# Patient Record
Sex: FEMALE | Race: OTHER | URBAN - METROPOLITAN AREA
[De-identification: names, ages, dates, MRNs, and addresses within clinical notes are randomized per-mention and may not be internally consistent; named-entity substitution may affect disease eponyms.]

---
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
## 2024-01-09 ENCOUNTER — OUTPATIENT (OUTPATIENT)
Dept: OUTPATIENT SERVICES | Facility: HOSPITAL | Age: 19
LOS: 1 days | End: 2024-01-09
Payer: MEDICAID

## 2024-01-09 ENCOUNTER — APPOINTMENT (OUTPATIENT)
Dept: OBGYN | Facility: CLINIC | Age: 19
End: 2024-01-09
Payer: MEDICAID

## 2024-01-09 VITALS — WEIGHT: 194 LBS | SYSTOLIC BLOOD PRESSURE: 116 MMHG | DIASTOLIC BLOOD PRESSURE: 60 MMHG

## 2024-01-09 DIAGNOSIS — Z34.90 ENCOUNTER FOR SUPERVISION OF NORMAL PREGNANCY, UNSPECIFIED, UNSPECIFIED TRIMESTER: ICD-10-CM

## 2024-01-09 LAB
BILIRUB UR QL STRIP: NEGATIVE
CLARITY UR: CLEAR
COLLECTION METHOD: NORMAL
GLUCOSE UR-MCNC: NEGATIVE
HCG UR QL: NEGATIVE EU/DL
HGB UR QL STRIP.AUTO: NEGATIVE
KETONES UR-MCNC: NEGATIVE
LEUKOCYTE ESTERASE UR QL STRIP: NEGATIVE
NITRITE UR QL STRIP: NEGATIVE
PH UR STRIP: 6
PROT UR STRIP-MCNC: NEGATIVE
SP GR UR STRIP: 1.02

## 2024-01-09 PROCEDURE — 99212 OFFICE O/P EST SF 10 MIN: CPT

## 2024-01-09 PROCEDURE — 81002 URINALYSIS NONAUTO W/O SCOPE: CPT

## 2024-01-10 DIAGNOSIS — Z34.90 ENCOUNTER FOR SUPERVISION OF NORMAL PREGNANCY, UNSPECIFIED, UNSPECIFIED TRIMESTER: ICD-10-CM

## 2024-01-11 LAB
ALBUMIN SERPL ELPH-MCNC: 4 G/DL
ALP BLD-CCNC: 86 U/L
ALT SERPL-CCNC: 16 U/L
ANION GAP SERPL CALC-SCNC: 19 MMOL/L
AST SERPL-CCNC: 16 U/L
BACTERIA UR CULT: NORMAL
BILIRUB SERPL-MCNC: 0.4 MG/DL
BLD GP AB SCN SERPL QL: NORMAL
BUN SERPL-MCNC: 8 MG/DL
CALCIUM SERPL-MCNC: 9.1 MG/DL
CFTR MUT TESTED BLD/T: NEGATIVE
CHLORIDE SERPL-SCNC: 101 MMOL/L
CO2 SERPL-SCNC: 16 MMOL/L
CREAT SERPL-MCNC: 0.6 MG/DL
EGFR: 133 ML/MIN/1.73M2
ESTIMATED AVERAGE GLUCOSE: 103 MG/DL
GLUCOSE SERPL-MCNC: 88 MG/DL
HBA1C MFR BLD HPLC: 5.2 %
HBV SURFACE AG SER QL: NONREACTIVE
HCV AB SER QL: NONREACTIVE
HCV S/CO RATIO: 0.19 S/CO
HGB A MFR BLD: 97.7 %
HGB A2 MFR BLD: 2.3 %
HGB FRACT BLD-IMP: NORMAL
HIV1+2 AB SPEC QL IA.RAPID: NONREACTIVE
LEAD BLD-MCNC: <1 UG/DL
M TB IFN-G BLD-IMP: NEGATIVE
MEV IGG FLD QL IA: >300 AU/ML
MEV IGG+IGM SER-IMP: POSITIVE
MUV AB SER-ACNC: POSITIVE
MUV IGG SER QL IA: 12.4 AU/ML
POTASSIUM SERPL-SCNC: 4.1 MMOL/L
PROT SERPL-MCNC: 6.6 G/DL
QUANTIFERON TB PLUS MITOGEN MINUS NIL: 3.69 IU/ML
QUANTIFERON TB PLUS NIL: 0.03 IU/ML
QUANTIFERON TB PLUS TB1 MINUS NIL: -0.01 IU/ML
QUANTIFERON TB PLUS TB2 MINUS NIL: -0.02 IU/ML
RUBV IGG FLD-ACNC: 1.8 INDEX
RUBV IGG SER-IMP: POSITIVE
SODIUM SERPL-SCNC: 136 MMOL/L
T PALLIDUM AB SER QL IA: NEGATIVE
VZV AB TITR SER: POSITIVE
VZV IGG SER IF-ACNC: 361.1 INDEX

## 2024-01-16 RX ORDER — METOCLOPRAMIDE 5 MG/1
5 TABLET ORAL 3 TIMES DAILY
Qty: 20 | Refills: 0 | Status: ACTIVE | COMMUNITY
Start: 2023-09-05 | End: 1900-01-01

## 2024-01-22 ENCOUNTER — NON-APPOINTMENT (OUTPATIENT)
Age: 19
End: 2024-01-22

## 2024-01-25 ENCOUNTER — APPOINTMENT (OUTPATIENT)
Dept: ANTEPARTUM | Facility: CLINIC | Age: 19
End: 2024-01-25
Payer: MEDICAID

## 2024-01-25 ENCOUNTER — ASOB RESULT (OUTPATIENT)
Age: 19
End: 2024-01-25

## 2024-01-25 ENCOUNTER — APPOINTMENT (OUTPATIENT)
Dept: OBGYN | Facility: CLINIC | Age: 19
End: 2024-01-25
Payer: MEDICAID

## 2024-01-25 ENCOUNTER — OUTPATIENT (OUTPATIENT)
Dept: OUTPATIENT SERVICES | Facility: HOSPITAL | Age: 19
LOS: 1 days | End: 2024-01-25
Payer: MEDICAID

## 2024-01-25 VITALS — DIASTOLIC BLOOD PRESSURE: 74 MMHG | WEIGHT: 202 LBS | SYSTOLIC BLOOD PRESSURE: 112 MMHG

## 2024-01-25 DIAGNOSIS — Z03.74 ENCOUNTER FOR SUSPECTED PROBLEM WITH FETAL GROWTH RULED OUT: ICD-10-CM

## 2024-01-25 DIAGNOSIS — Z3A.32 32 WEEKS GESTATION OF PREGNANCY: ICD-10-CM

## 2024-01-25 DIAGNOSIS — Z34.90 ENCOUNTER FOR SUPERVISION OF NORMAL PREGNANCY, UNSPECIFIED, UNSPECIFIED TRIMESTER: ICD-10-CM

## 2024-01-25 DIAGNOSIS — O26.853 SPOTTING COMPLICATING PREGNANCY, THIRD TRIMESTER: ICD-10-CM

## 2024-01-25 PROCEDURE — 99212 OFFICE O/P EST SF 10 MIN: CPT

## 2024-01-25 PROCEDURE — 76816 OB US FOLLOW-UP PER FETUS: CPT

## 2024-01-25 PROCEDURE — 76816 OB US FOLLOW-UP PER FETUS: CPT | Mod: 26

## 2024-01-25 PROCEDURE — 76819 FETAL BIOPHYS PROFIL W/O NST: CPT | Mod: 26,59

## 2024-01-25 PROCEDURE — 81002 URINALYSIS NONAUTO W/O SCOPE: CPT

## 2024-01-25 PROCEDURE — 76819 FETAL BIOPHYS PROFIL W/O NST: CPT

## 2024-01-27 LAB
BILIRUB UR QL STRIP: NORMAL
CLARITY UR: CLEAR
GLUCOSE UR-MCNC: NORMAL
HCG UR QL: NORMAL EU/DL
HGB UR QL STRIP.AUTO: NORMAL
KETONES UR-MCNC: NORMAL
LEUKOCYTE ESTERASE UR QL STRIP: NORMAL
NITRITE UR QL STRIP: NORMAL
PH UR STRIP: 6
PROT UR STRIP-MCNC: NORMAL
SP GR UR STRIP: 1.01

## 2024-02-08 ENCOUNTER — NON-APPOINTMENT (OUTPATIENT)
Age: 19
End: 2024-02-08

## 2024-02-08 ENCOUNTER — OUTPATIENT (OUTPATIENT)
Dept: OUTPATIENT SERVICES | Facility: HOSPITAL | Age: 19
LOS: 1 days | End: 2024-02-08
Payer: MEDICAID

## 2024-02-08 ENCOUNTER — OUTPATIENT (OUTPATIENT)
Dept: INPATIENT UNIT | Facility: HOSPITAL | Age: 19
LOS: 1 days | Discharge: HOME | End: 2024-02-08
Payer: MEDICAID

## 2024-02-08 ENCOUNTER — APPOINTMENT (OUTPATIENT)
Dept: OBGYN | Facility: CLINIC | Age: 19
End: 2024-02-08
Payer: MEDICAID

## 2024-02-08 VITALS — SYSTOLIC BLOOD PRESSURE: 150 MMHG | WEIGHT: 197 LBS | DIASTOLIC BLOOD PRESSURE: 68 MMHG

## 2024-02-08 VITALS — SYSTOLIC BLOOD PRESSURE: 121 MMHG | HEART RATE: 91 BPM | DIASTOLIC BLOOD PRESSURE: 66 MMHG

## 2024-02-08 VITALS — TEMPERATURE: 98 F | DIASTOLIC BLOOD PRESSURE: 63 MMHG | HEART RATE: 87 BPM | SYSTOLIC BLOOD PRESSURE: 116 MMHG

## 2024-02-08 VITALS — DIASTOLIC BLOOD PRESSURE: 82 MMHG | SYSTOLIC BLOOD PRESSURE: 130 MMHG

## 2024-02-08 DIAGNOSIS — Z34.90 ENCOUNTER FOR SUPERVISION OF NORMAL PREGNANCY, UNSPECIFIED, UNSPECIFIED TRIMESTER: ICD-10-CM

## 2024-02-08 DIAGNOSIS — O26.899 OTHER SPECIFIED PREGNANCY RELATED CONDITIONS, UNSPECIFIED TRIMESTER: ICD-10-CM

## 2024-02-08 LAB
ALBUMIN SERPL ELPH-MCNC: 3.4 G/DL — LOW (ref 3.5–5.2)
ALP SERPL-CCNC: 218 U/L — HIGH (ref 30–115)
ALT FLD-CCNC: 64 U/L — HIGH (ref 14–37)
ANION GAP SERPL CALC-SCNC: 12 MMOL/L — SIGNIFICANT CHANGE UP (ref 7–14)
APTT BLD: 29.5 SEC — SIGNIFICANT CHANGE UP (ref 27–39.2)
AST SERPL-CCNC: 26 U/L — SIGNIFICANT CHANGE UP (ref 14–37)
BASOPHILS # BLD AUTO: 0.04 K/UL — SIGNIFICANT CHANGE UP (ref 0–0.2)
BASOPHILS NFR BLD AUTO: 0.3 % — SIGNIFICANT CHANGE UP (ref 0–1)
BILIRUB SERPL-MCNC: 0.4 MG/DL — SIGNIFICANT CHANGE UP (ref 0.2–1.2)
BUN SERPL-MCNC: 11 MG/DL — SIGNIFICANT CHANGE UP (ref 10–20)
CALCIUM SERPL-MCNC: 9.2 MG/DL — SIGNIFICANT CHANGE UP (ref 8.4–10.5)
CHLORIDE SERPL-SCNC: 105 MMOL/L — SIGNIFICANT CHANGE UP (ref 98–110)
CO2 SERPL-SCNC: 20 MMOL/L — SIGNIFICANT CHANGE UP (ref 17–32)
CREAT SERPL-MCNC: 0.6 MG/DL — SIGNIFICANT CHANGE UP (ref 0.3–1)
EGFR: 133 ML/MIN/1.73M2 — SIGNIFICANT CHANGE UP
EOSINOPHIL # BLD AUTO: 0.09 K/UL — SIGNIFICANT CHANGE UP (ref 0–0.7)
EOSINOPHIL NFR BLD AUTO: 0.7 % — SIGNIFICANT CHANGE UP (ref 0–8)
FIBRINOGEN PPP-MCNC: 579 MG/DL — HIGH (ref 200–435)
GLUCOSE SERPL-MCNC: 87 MG/DL — SIGNIFICANT CHANGE UP (ref 70–99)
HCT VFR BLD CALC: 33.7 % — LOW (ref 37–47)
HGB BLD-MCNC: 11.6 G/DL — LOW (ref 12–16)
IMM GRANULOCYTES NFR BLD AUTO: 1 % — HIGH (ref 0.1–0.3)
INR BLD: 0.99 RATIO — SIGNIFICANT CHANGE UP (ref 0.65–1.3)
LDH SERPL L TO P-CCNC: 172 — SIGNIFICANT CHANGE UP (ref 50–242)
LYMPHOCYTES # BLD AUTO: 16.1 % — LOW (ref 20.5–51.1)
LYMPHOCYTES # BLD AUTO: 2.12 K/UL — SIGNIFICANT CHANGE UP (ref 1.2–3.4)
MCHC RBC-ENTMCNC: 30.4 PG — SIGNIFICANT CHANGE UP (ref 27–31)
MCHC RBC-ENTMCNC: 34.4 G/DL — SIGNIFICANT CHANGE UP (ref 32–37)
MCV RBC AUTO: 88.5 FL — SIGNIFICANT CHANGE UP (ref 81–99)
MONOCYTES # BLD AUTO: 0.82 K/UL — HIGH (ref 0.1–0.6)
MONOCYTES NFR BLD AUTO: 6.2 % — SIGNIFICANT CHANGE UP (ref 1.7–9.3)
NEUTROPHILS # BLD AUTO: 9.97 K/UL — HIGH (ref 1.4–6.5)
NEUTROPHILS NFR BLD AUTO: 75.7 % — HIGH (ref 42.2–75.2)
NRBC # BLD: 0 /100 WBCS — SIGNIFICANT CHANGE UP (ref 0–0)
PLATELET # BLD AUTO: 284 K/UL — SIGNIFICANT CHANGE UP (ref 130–400)
PMV BLD: 11.6 FL — HIGH (ref 7.4–10.4)
POTASSIUM SERPL-MCNC: 4.2 MMOL/L — SIGNIFICANT CHANGE UP (ref 3.5–5)
POTASSIUM SERPL-SCNC: 4.2 MMOL/L — SIGNIFICANT CHANGE UP (ref 3.5–5)
PROT SERPL-MCNC: 6.4 G/DL — SIGNIFICANT CHANGE UP (ref 6.1–8)
PROTHROM AB SERPL-ACNC: 11.3 SEC — SIGNIFICANT CHANGE UP (ref 9.95–12.87)
RBC # BLD: 3.81 M/UL — LOW (ref 4.2–5.4)
RBC # FLD: 12.8 % — SIGNIFICANT CHANGE UP (ref 11.5–14.5)
SODIUM SERPL-SCNC: 137 MMOL/L — SIGNIFICANT CHANGE UP (ref 135–146)
URATE SERPL-MCNC: 3.5 MG/DL — SIGNIFICANT CHANGE UP (ref 2.5–7)
WBC # BLD: 13.17 K/UL — HIGH (ref 4.8–10.8)
WBC # FLD AUTO: 13.17 K/UL — HIGH (ref 4.8–10.8)

## 2024-02-08 PROCEDURE — 87389 HIV-1 AG W/HIV-1&-2 AB AG IA: CPT

## 2024-02-08 PROCEDURE — 87661 TRICHOMONAS VAGINALIS AMPLIF: CPT

## 2024-02-08 PROCEDURE — 85610 PROTHROMBIN TIME: CPT

## 2024-02-08 PROCEDURE — 82977 ASSAY OF GGT: CPT

## 2024-02-08 PROCEDURE — 85384 FIBRINOGEN ACTIVITY: CPT

## 2024-02-08 PROCEDURE — 82247 BILIRUBIN TOTAL: CPT

## 2024-02-08 PROCEDURE — 84156 ASSAY OF PROTEIN URINE: CPT

## 2024-02-08 PROCEDURE — 80053 COMPREHEN METABOLIC PANEL: CPT

## 2024-02-08 PROCEDURE — 82239 BILE ACIDS TOTAL: CPT

## 2024-02-08 PROCEDURE — 83615 LACTATE (LD) (LDH) ENZYME: CPT

## 2024-02-08 PROCEDURE — 86593 SYPHILIS TEST NON-TREP QUANT: CPT

## 2024-02-08 PROCEDURE — 82570 ASSAY OF URINE CREATININE: CPT

## 2024-02-08 PROCEDURE — 87340 HEPATITIS B SURFACE AG IA: CPT

## 2024-02-08 PROCEDURE — 99214 OFFICE O/P EST MOD 30 MIN: CPT

## 2024-02-08 PROCEDURE — 87591 N.GONORRHOEAE DNA AMP PROB: CPT

## 2024-02-08 PROCEDURE — 99213 OFFICE O/P EST LOW 20 MIN: CPT

## 2024-02-08 PROCEDURE — 81002 URINALYSIS NONAUTO W/O SCOPE: CPT

## 2024-02-08 PROCEDURE — 84550 ASSAY OF BLOOD/URIC ACID: CPT

## 2024-02-08 PROCEDURE — 85730 THROMBOPLASTIN TIME PARTIAL: CPT

## 2024-02-08 PROCEDURE — 99418 PROLNG IP/OBS E/M EA 15 MIN: CPT | Mod: 25

## 2024-02-08 PROCEDURE — 36415 COLL VENOUS BLD VENIPUNCTURE: CPT

## 2024-02-08 PROCEDURE — 87653 STREP B DNA AMP PROBE: CPT

## 2024-02-08 PROCEDURE — 85027 COMPLETE CBC AUTOMATED: CPT

## 2024-02-08 PROCEDURE — 99223 1ST HOSP IP/OBS HIGH 75: CPT | Mod: 25

## 2024-02-08 PROCEDURE — 87491 CHLMYD TRACH DNA AMP PROBE: CPT

## 2024-02-08 PROCEDURE — 85025 COMPLETE CBC W/AUTO DIFF WBC: CPT

## 2024-02-08 RX ORDER — CLOBETASOL PROPIONATE 0.5 MG/G
0.05 CREAM TOPICAL TWICE DAILY
Qty: 1 | Refills: 0 | Status: ACTIVE | COMMUNITY
Start: 2024-02-08 | End: 1900-01-01

## 2024-02-08 RX ORDER — CAMPHOR 0.45 %
25 GEL (GRAM) TOPICAL
Qty: 30 | Refills: 0 | Status: ACTIVE | COMMUNITY
Start: 2024-02-08 | End: 1900-01-01

## 2024-02-08 NOTE — OB PROVIDER TRIAGE NOTE - NSHPPHYSICALEXAM_GEN_ALL_CORE
T(C): 36.4 (02-08-24 @ 17:52), Max: 36.4 (02-08-24 @ 17:52)  HR: 87 (02-08-24 @ 17:52) (87 - 87)  BP: 116/63 (02-08-24 @ 17:52) (116/63 - 116/63)    Gen: A+OX3. NAD  Cardio: RRR  Resp: CTAB  Abd: Soft, Nontender. Gravid. No palpable contractions  SVE: C/L/P  Speculum: small scratch on vagina from speculum placement that is bleeding, cervix appears closed with no bleeding noted, no lesions, or abnormal discharge  BSS: BPP 8/8    FHR: 135BPM/mod evelia/accels pos   TOCO: no contractions

## 2024-02-08 NOTE — OB PROVIDER TRIAGE NOTE - NSOBPROVIDERNOTE_OBGYN_ALL_OB_FT
18y  at 34w6d, GBS unknown, with ithcing isolated elevated BP in office, s/p fall on her knees at 1540, reassuring fetal and maternal status    #r/o ICP  -f.u outpatient bile acids  -f/u CMP    #r/o PIH  -vitals q15min  -f/u PELs    #r/o abruption  -f/u abruption labs  -EFM/TOCO  -if not contractions monitoring may be terminated at 1940 18y  at 34w6d, GBS unknown, with ithcing isolated elevated BP in office, s/p fall on her knees at 1540, reassuring fetal and maternal status    #r/o ICP  -f.u outpatient bile acids  -f/u CMP    #r/o PIH  -vitals q15min  -f/u PELs    #r/o abruption  -f/u abruption labs  -EFM/TOCO  -if not contractions monitoring may be terminated at 1940    ADDENDUM: patient seen and assessed at bedside, reports she is feeling well. COntinues to deny obstetric complaints, endorses good fetal movement. Reviewed laboratory results, all wnl with exception of ALT, c/w outpatient labs today. Discussed patient likely has ICP. BPs wnl today, advised to monitor bps at home and keep a log. We discussed at length precautions and reasons to contact her physician and return the the hospital, she understood and all of her questions were answered.

## 2024-02-08 NOTE — OB PROVIDER TRIAGE NOTE - ATTENDING COMMENTS
I was physically present for the key portions of the evaluation and management (e/m) service provided. I agree with the above history,physical and plan which I have reviewed and edited where appropriate    Patient seen face to face and case reviewed, precautions given to patient    Patient presented to triage and was evaluated starting 17:47. The fetal heart rate monitor ended 20:47. I spent 180 minutes caring for the patient. It included obtaining a history, performing an examination, continuously monitoring the fetus and interpretation of the fetal heart rate strip, ordering and reviewing labs, documentingin the medical record and a discussion with the patient.

## 2024-02-08 NOTE — OB PROVIDER TRIAGE NOTE - NSHPLABSRESULTS_GEN_ALL_CORE
GCT 95    9/7/2023  Type and Screen: O pos  Antibody screen: neg   Rubella: immune  Varicella: immune   RPR: neg  HbSAg: neg  HIV: NR    9/7 O pos    Sonogram  32w6d EFW 2040gm 37%ile, BPP 8/8, 6.56cm, vtx, anterior placenta no previa  19w5d vtx, anterior no previa, MVP 4.35cm, 349gm, normal anatomy

## 2024-02-08 NOTE — OB PROVIDER TRIAGE NOTE - HISTORY OF PRESENT ILLNESS
16yo  at 34w6d dated by LMP c/w first trimester sono with CLAUDIA 3/15/24 presenting s/p fall on her knees that occurred approx 1 hour ago. Denies abdominal trauma, denies CTX, LOF, VB. Endorses good FM. Denies complications this pregnancy. Denies HA, dizziness, SOB, chest pain, palpitations, RUQ/epigastric pain, or LE swelling. Was seen in office today BP 150systolic with repeat wnl, was given BP cuff with instructions to monitor BPs at home. PELs performed today, AST/ALT     Last PO: this afternoon  Last BM: this AM

## 2024-02-09 DIAGNOSIS — R03.0 ELEVATED BLOOD-PRESSURE READING, WITHOUT DIAGNOSIS OF HYPERTENSION: ICD-10-CM

## 2024-02-09 DIAGNOSIS — O99.713 DISEASES OF THE SKIN AND SUBCUTANEOUS TISSUE COMPLICATING PREGNANCY, THIRD TRIMESTER: ICD-10-CM

## 2024-02-09 DIAGNOSIS — Z04.3 ENCOUNTER FOR EXAMINATION AND OBSERVATION FOLLOWING OTHER ACCIDENT: ICD-10-CM

## 2024-02-09 DIAGNOSIS — Z34.90 ENCOUNTER FOR SUPERVISION OF NORMAL PREGNANCY, UNSPECIFIED, UNSPECIFIED TRIMESTER: ICD-10-CM

## 2024-02-09 DIAGNOSIS — L29.9 PRURITUS, UNSPECIFIED: ICD-10-CM

## 2024-02-09 DIAGNOSIS — Z3A.35 35 WEEKS GESTATION OF PREGNANCY: ICD-10-CM

## 2024-02-09 DIAGNOSIS — O26.893 OTHER SPECIFIED PREGNANCY RELATED CONDITIONS, THIRD TRIMESTER: ICD-10-CM

## 2024-02-13 LAB
ALBUMIN SERPL ELPH-MCNC: 3.6 G/DL
ALP BLD-CCNC: 228 U/L
ALT SERPL-CCNC: 72 U/L
ANION GAP SERPL CALC-SCNC: 12 MMOL/L
AST SERPL-CCNC: 28 U/L
BASOPHILS # BLD AUTO: 0.04 K/UL
BASOPHILS NFR BLD AUTO: 0.3 %
BILE AC SER-MCNC: 3.6 UMOL/L
BILIRUB SERPL-MCNC: 0.4 MG/DL
BILIRUB SERPL-MCNC: 0.4 MG/DL
BUN SERPL-MCNC: 10 MG/DL
C TRACH RRNA SPEC QL NAA+PROBE: NOT DETECTED
CALCIUM SERPL-MCNC: 10 MG/DL
CHLORIDE SERPL-SCNC: 105 MMOL/L
CO2 SERPL-SCNC: 20 MMOL/L
CREAT SERPL-MCNC: 0.6 MG/DL
CREAT SPEC-SCNC: 77 MG/DL
CREAT/PROT UR: 0.2 RATIO
EGFR: 133 ML/MIN/1.73M2
EOSINOPHIL # BLD AUTO: 0.08 K/UL
EOSINOPHIL NFR BLD AUTO: 0.6 %
GGT SERPL-CCNC: 12 U/L
GLUCOSE SERPL-MCNC: 89 MG/DL
GP B STREP DNA SPEC QL NAA+PROBE: NOT DETECTED
HBV SURFACE AG SER QL: NONREACTIVE
HCT VFR BLD CALC: 37.1 %
HGB BLD-MCNC: 12.4 G/DL
HIV1+2 AB SPEC QL IA.RAPID: NONREACTIVE
IMM GRANULOCYTES NFR BLD AUTO: 0.9 %
LDH SERPL-CCNC: 188
LYMPHOCYTES # BLD AUTO: 2.13 K/UL
LYMPHOCYTES NFR BLD AUTO: 16.6 %
MAN DIFF?: NORMAL
MCHC RBC-ENTMCNC: 30.5 PG
MCHC RBC-ENTMCNC: 33.4 G/DL
MCV RBC AUTO: 91.4 FL
MONOCYTES # BLD AUTO: 0.77 K/UL
MONOCYTES NFR BLD AUTO: 6 %
N GONORRHOEA RRNA SPEC QL NAA+PROBE: NOT DETECTED
NEUTROPHILS # BLD AUTO: 9.72 K/UL
NEUTROPHILS NFR BLD AUTO: 75.6 %
PLATELET # BLD AUTO: 289 K/UL
PMV BLD AUTO: 0 /100 WBCS
POTASSIUM SERPL-SCNC: 4.2 MMOL/L
PROT SERPL-MCNC: 6.9 G/DL
PROT UR-MCNC: 16 MG/DLG/24H
RBC # BLD: 4.06 M/UL
RBC # FLD: 13 %
RPR SER-TITR: NORMAL
SODIUM SERPL-SCNC: 137 MMOL/L
SOURCE AMPLIFICATION: NORMAL
SOURCE AMPLIFICATION: NORMAL
SOURCE GBS: NORMAL
T VAGINALIS RRNA SPEC QL NAA+PROBE: NOT DETECTED
URATE SERPL-MCNC: 3.4 MG/DL
WBC # FLD AUTO: 12.85 K/UL

## 2024-02-14 ENCOUNTER — NON-APPOINTMENT (OUTPATIENT)
Age: 19
End: 2024-02-14

## 2024-02-15 ENCOUNTER — APPOINTMENT (OUTPATIENT)
Dept: OBGYN | Facility: CLINIC | Age: 19
End: 2024-02-15
Payer: MEDICAID

## 2024-02-15 ENCOUNTER — OUTPATIENT (OUTPATIENT)
Dept: OUTPATIENT SERVICES | Facility: HOSPITAL | Age: 19
LOS: 1 days | End: 2024-02-15
Payer: MEDICAID

## 2024-02-15 VITALS
SYSTOLIC BLOOD PRESSURE: 100 MMHG | WEIGHT: 202 LBS | HEIGHT: 61 IN | BODY MASS INDEX: 38.14 KG/M2 | DIASTOLIC BLOOD PRESSURE: 60 MMHG

## 2024-02-15 DIAGNOSIS — Z34.90 ENCOUNTER FOR SUPERVISION OF NORMAL PREGNANCY, UNSPECIFIED, UNSPECIFIED TRIMESTER: ICD-10-CM

## 2024-02-15 PROCEDURE — 99213 OFFICE O/P EST LOW 20 MIN: CPT

## 2024-02-15 PROCEDURE — 81002 URINALYSIS NONAUTO W/O SCOPE: CPT

## 2024-02-15 PROCEDURE — 80053 COMPREHEN METABOLIC PANEL: CPT

## 2024-02-15 PROCEDURE — 82239 BILE ACIDS TOTAL: CPT

## 2024-02-21 ENCOUNTER — NON-APPOINTMENT (OUTPATIENT)
Age: 19
End: 2024-02-21

## 2024-02-21 DIAGNOSIS — Z34.90 ENCOUNTER FOR SUPERVISION OF NORMAL PREGNANCY, UNSPECIFIED, UNSPECIFIED TRIMESTER: ICD-10-CM

## 2024-02-22 ENCOUNTER — APPOINTMENT (OUTPATIENT)
Dept: OBGYN | Facility: CLINIC | Age: 19
End: 2024-02-22
Payer: MEDICAID

## 2024-02-22 ENCOUNTER — OUTPATIENT (OUTPATIENT)
Dept: OUTPATIENT SERVICES | Facility: HOSPITAL | Age: 19
LOS: 1 days | End: 2024-02-22
Payer: MEDICAID

## 2024-02-22 VITALS — SYSTOLIC BLOOD PRESSURE: 120 MMHG | WEIGHT: 205 LBS | DIASTOLIC BLOOD PRESSURE: 70 MMHG

## 2024-02-22 DIAGNOSIS — Z34.90 ENCOUNTER FOR SUPERVISION OF NORMAL PREGNANCY, UNSPECIFIED, UNSPECIFIED TRIMESTER: ICD-10-CM

## 2024-02-22 LAB
ALBUMIN SERPL ELPH-MCNC: 3.4 G/DL
ALP BLD-CCNC: 205 U/L
ALT SERPL-CCNC: 34 U/L
ANION GAP SERPL CALC-SCNC: 13 MMOL/L
AST SERPL-CCNC: 20 U/L
BILE AC SER-MCNC: 6 UMOL/L
BILIRUB SERPL-MCNC: 0.4 MG/DL
BILIRUB UR QL STRIP: NORMAL
BUN SERPL-MCNC: 10 MG/DL
CALCIUM SERPL-MCNC: 8.8 MG/DL
CHLORIDE SERPL-SCNC: 105 MMOL/L
CLARITY UR: CLEAR
CO2 SERPL-SCNC: 20 MMOL/L
COLLECTION METHOD: NORMAL
CREAT SERPL-MCNC: 0.6 MG/DL
EGFR: 133 ML/MIN/1.73M2
GLUCOSE SERPL-MCNC: 79 MG/DL
GLUCOSE UR-MCNC: NORMAL
HCG UR QL: 0.2 EU/DL
HGB UR QL STRIP.AUTO: NORMAL
KETONES UR-MCNC: NORMAL
LEUKOCYTE ESTERASE UR QL STRIP: NORMAL
NITRITE UR QL STRIP: NORMAL
PH UR STRIP: 6
POTASSIUM SERPL-SCNC: 4.3 MMOL/L
PROT SERPL-MCNC: 6 G/DL
PROT UR STRIP-MCNC: NORMAL
SODIUM SERPL-SCNC: 138 MMOL/L
SP GR UR STRIP: 1.02

## 2024-02-22 PROCEDURE — 81002 URINALYSIS NONAUTO W/O SCOPE: CPT

## 2024-02-22 PROCEDURE — 99213 OFFICE O/P EST LOW 20 MIN: CPT

## 2024-02-23 ENCOUNTER — NON-APPOINTMENT (OUTPATIENT)
Age: 19
End: 2024-02-23

## 2024-02-23 ENCOUNTER — OUTPATIENT (OUTPATIENT)
Dept: OUTPATIENT SERVICES | Facility: HOSPITAL | Age: 19
LOS: 1 days | End: 2024-02-23

## 2024-02-23 ENCOUNTER — APPOINTMENT (OUTPATIENT)
Dept: OBGYN | Facility: CLINIC | Age: 19
End: 2024-02-23

## 2024-02-23 DIAGNOSIS — Z34.90 ENCOUNTER FOR SUPERVISION OF NORMAL PREGNANCY, UNSPECIFIED, UNSPECIFIED TRIMESTER: ICD-10-CM

## 2024-02-27 DIAGNOSIS — Z34.90 ENCOUNTER FOR SUPERVISION OF NORMAL PREGNANCY, UNSPECIFIED, UNSPECIFIED TRIMESTER: ICD-10-CM

## 2024-02-29 ENCOUNTER — OUTPATIENT (OUTPATIENT)
Dept: OUTPATIENT SERVICES | Facility: HOSPITAL | Age: 19
LOS: 1 days | End: 2024-02-29
Payer: MEDICAID

## 2024-02-29 ENCOUNTER — RESULT CHARGE (OUTPATIENT)
Age: 19
End: 2024-02-29

## 2024-02-29 ENCOUNTER — NON-APPOINTMENT (OUTPATIENT)
Age: 19
End: 2024-02-29

## 2024-02-29 ENCOUNTER — APPOINTMENT (OUTPATIENT)
Dept: OBGYN | Facility: CLINIC | Age: 19
End: 2024-02-29
Payer: MEDICAID

## 2024-02-29 VITALS
HEIGHT: 61 IN | DIASTOLIC BLOOD PRESSURE: 80 MMHG | BODY MASS INDEX: 38.51 KG/M2 | WEIGHT: 204 LBS | SYSTOLIC BLOOD PRESSURE: 126 MMHG

## 2024-02-29 VITALS
BODY MASS INDEX: 38.51 KG/M2 | SYSTOLIC BLOOD PRESSURE: 126 MMHG | DIASTOLIC BLOOD PRESSURE: 80 MMHG | WEIGHT: 204 LBS | HEIGHT: 61 IN

## 2024-02-29 DIAGNOSIS — Z34.90 ENCOUNTER FOR SUPERVISION OF NORMAL PREGNANCY, UNSPECIFIED, UNSPECIFIED TRIMESTER: ICD-10-CM

## 2024-02-29 PROCEDURE — 99213 OFFICE O/P EST LOW 20 MIN: CPT

## 2024-02-29 PROCEDURE — 82239 BILE ACIDS TOTAL: CPT

## 2024-02-29 PROCEDURE — 81002 URINALYSIS NONAUTO W/O SCOPE: CPT

## 2024-02-29 PROCEDURE — 80053 COMPREHEN METABOLIC PANEL: CPT

## 2024-03-01 ENCOUNTER — NON-APPOINTMENT (OUTPATIENT)
Age: 19
End: 2024-03-01

## 2024-03-05 ENCOUNTER — INPATIENT (INPATIENT)
Facility: HOSPITAL | Age: 19
LOS: 1 days | Discharge: HOME | DRG: 560 | End: 2024-03-07
Attending: STUDENT IN AN ORGANIZED HEALTH CARE EDUCATION/TRAINING PROGRAM | Admitting: STUDENT IN AN ORGANIZED HEALTH CARE EDUCATION/TRAINING PROGRAM
Payer: MEDICAID

## 2024-03-05 ENCOUNTER — NON-APPOINTMENT (OUTPATIENT)
Age: 19
End: 2024-03-05

## 2024-03-05 ENCOUNTER — OUTPATIENT (OUTPATIENT)
Dept: INPATIENT UNIT | Facility: HOSPITAL | Age: 19
LOS: 1 days | Discharge: HOME | End: 2024-03-05
Payer: MEDICAID

## 2024-03-05 VITALS
DIASTOLIC BLOOD PRESSURE: 70 MMHG | HEART RATE: 83 BPM | RESPIRATION RATE: 18 BRPM | SYSTOLIC BLOOD PRESSURE: 123 MMHG | TEMPERATURE: 98 F

## 2024-03-05 VITALS
SYSTOLIC BLOOD PRESSURE: 128 MMHG | DIASTOLIC BLOOD PRESSURE: 74 MMHG | RESPIRATION RATE: 16 BRPM | HEART RATE: 99 BPM | TEMPERATURE: 98 F

## 2024-03-05 DIAGNOSIS — O26.899 OTHER SPECIFIED PREGNANCY RELATED CONDITIONS, UNSPECIFIED TRIMESTER: ICD-10-CM

## 2024-03-05 DIAGNOSIS — O42.92 FULL-TERM PREMATURE RUPTURE OF MEMBRANES, UNSPECIFIED AS TO LENGTH OF TIME BETWEEN RUPTURE AND ONSET OF LABOR: ICD-10-CM

## 2024-03-05 LAB
ALBUMIN SERPL ELPH-MCNC: 3.7 G/DL
ALP BLD-CCNC: 256 U/L
ALT SERPL-CCNC: 54 U/L
ANION GAP SERPL CALC-SCNC: 8 MMOL/L
APPEARANCE UR: CLEAR — SIGNIFICANT CHANGE UP
AST SERPL-CCNC: 31 U/L
BACTERIA # UR AUTO: NEGATIVE /HPF — SIGNIFICANT CHANGE UP
BASOPHILS # BLD AUTO: 0.04 K/UL — SIGNIFICANT CHANGE UP (ref 0–0.2)
BASOPHILS NFR BLD AUTO: 0.2 % — SIGNIFICANT CHANGE UP (ref 0–1)
BILIRUB SERPL-MCNC: 0.4 MG/DL
BILIRUB UR QL STRIP: NEGATIVE
BILIRUB UR-MCNC: NEGATIVE — SIGNIFICANT CHANGE UP
BLD GP AB SCN SERPL QL: SIGNIFICANT CHANGE UP
BUN SERPL-MCNC: 10 MG/DL
CALCIUM SERPL-MCNC: 9.1 MG/DL
CAST: 9 /LPF — HIGH (ref 0–4)
CHLORIDE SERPL-SCNC: 106 MMOL/L
CLARITY UR: CLEAR
CO2 SERPL-SCNC: 25 MMOL/L
COLLECTION METHOD: NORMAL
COLOR SPEC: YELLOW — SIGNIFICANT CHANGE UP
CREAT SERPL-MCNC: 0.7 MG/DL
DIFF PNL FLD: ABNORMAL
EGFR: 128 ML/MIN/1.73M2
EOSINOPHIL # BLD AUTO: 0 K/UL — SIGNIFICANT CHANGE UP (ref 0–0.7)
EOSINOPHIL NFR BLD AUTO: 0 % — SIGNIFICANT CHANGE UP (ref 0–8)
GLUCOSE SERPL-MCNC: 82 MG/DL
GLUCOSE UR QL: NEGATIVE MG/DL — SIGNIFICANT CHANGE UP
GLUCOSE UR-MCNC: NEGATIVE
HCG UR QL: 0.2 EU/DL
HCT VFR BLD CALC: 36 % — LOW (ref 37–47)
HGB BLD-MCNC: 12.2 G/DL — SIGNIFICANT CHANGE UP (ref 12–16)
HGB UR QL STRIP.AUTO: NEGATIVE
IMM GRANULOCYTES NFR BLD AUTO: 0.8 % — HIGH (ref 0.1–0.3)
KETONES UR-MCNC: 40 MG/DL
KETONES UR-MCNC: NEGATIVE
LEUKOCYTE ESTERASE UR QL STRIP: NORMAL
LEUKOCYTE ESTERASE UR-ACNC: ABNORMAL
LYMPHOCYTES # BLD AUTO: 1.48 K/UL — SIGNIFICANT CHANGE UP (ref 1.2–3.4)
LYMPHOCYTES # BLD AUTO: 8.8 % — LOW (ref 20.5–51.1)
MCHC RBC-ENTMCNC: 30.5 PG — SIGNIFICANT CHANGE UP (ref 27–31)
MCHC RBC-ENTMCNC: 33.9 G/DL — SIGNIFICANT CHANGE UP (ref 32–37)
MCV RBC AUTO: 90 FL — SIGNIFICANT CHANGE UP (ref 81–99)
MONOCYTES # BLD AUTO: 0.67 K/UL — HIGH (ref 0.1–0.6)
MONOCYTES NFR BLD AUTO: 4 % — SIGNIFICANT CHANGE UP (ref 1.7–9.3)
NEUTROPHILS # BLD AUTO: 14.42 K/UL — HIGH (ref 1.4–6.5)
NEUTROPHILS NFR BLD AUTO: 86.2 % — HIGH (ref 42.2–75.2)
NITRITE UR QL STRIP: NEGATIVE
NITRITE UR-MCNC: NEGATIVE — SIGNIFICANT CHANGE UP
NRBC # BLD: 0 /100 WBCS — SIGNIFICANT CHANGE UP (ref 0–0)
PH UR STRIP: 6.5
PH UR: 6.5 — SIGNIFICANT CHANGE UP (ref 5–8)
PLATELET # BLD AUTO: 236 K/UL — SIGNIFICANT CHANGE UP (ref 130–400)
PMV BLD: 12.5 FL — HIGH (ref 7.4–10.4)
POTASSIUM SERPL-SCNC: 4.4 MMOL/L
PRENATAL SYPHILIS TEST: SIGNIFICANT CHANGE UP
PROT SERPL-MCNC: 6.8 G/DL
PROT UR STRIP-MCNC: NEGATIVE
PROT UR-MCNC: SIGNIFICANT CHANGE UP MG/DL
RBC # BLD: 4 M/UL — LOW (ref 4.2–5.4)
RBC # FLD: 13.9 % — SIGNIFICANT CHANGE UP (ref 11.5–14.5)
RBC CASTS # UR COMP ASSIST: 40 /HPF — HIGH (ref 0–4)
SODIUM SERPL-SCNC: 139 MMOL/L
SP GR SPEC: 1.02 — SIGNIFICANT CHANGE UP (ref 1–1.03)
SP GR UR STRIP: 1.01
SQUAMOUS # UR AUTO: 4 /HPF — SIGNIFICANT CHANGE UP (ref 0–5)
UROBILINOGEN FLD QL: 1 MG/DL — SIGNIFICANT CHANGE UP (ref 0.2–1)
WBC # BLD: 16.74 K/UL — HIGH (ref 4.8–10.8)
WBC # FLD AUTO: 16.74 K/UL — HIGH (ref 4.8–10.8)
WBC UR QL: 8 /HPF — HIGH (ref 0–5)

## 2024-03-05 PROCEDURE — 86850 RBC ANTIBODY SCREEN: CPT

## 2024-03-05 PROCEDURE — 59025 FETAL NON-STRESS TEST: CPT

## 2024-03-05 PROCEDURE — 76815 OB US LIMITED FETUS(S): CPT | Mod: 26

## 2024-03-05 PROCEDURE — 85025 COMPLETE CBC W/AUTO DIFF WBC: CPT

## 2024-03-05 PROCEDURE — 80307 DRUG TEST PRSMV CHEM ANLYZR: CPT

## 2024-03-05 PROCEDURE — 80354 DRUG SCREENING FENTANYL: CPT

## 2024-03-05 PROCEDURE — 36415 COLL VENOUS BLD VENIPUNCTURE: CPT

## 2024-03-05 PROCEDURE — 81001 URINALYSIS AUTO W/SCOPE: CPT

## 2024-03-05 PROCEDURE — 76818 FETAL BIOPHYS PROFILE W/NST: CPT | Mod: 26

## 2024-03-05 PROCEDURE — 86900 BLOOD TYPING SEROLOGIC ABO: CPT

## 2024-03-05 PROCEDURE — 86592 SYPHILIS TEST NON-TREP QUAL: CPT

## 2024-03-05 PROCEDURE — 59050 FETAL MONITOR W/REPORT: CPT

## 2024-03-05 PROCEDURE — 86901 BLOOD TYPING SEROLOGIC RH(D): CPT

## 2024-03-05 PROCEDURE — 99214 OFFICE O/P EST MOD 30 MIN: CPT

## 2024-03-05 PROCEDURE — 99223 1ST HOSP IP/OBS HIGH 75: CPT

## 2024-03-05 RX ORDER — OXYTOCIN 10 UNIT/ML
333.33 VIAL (ML) INJECTION
Qty: 20 | Refills: 0 | Status: DISCONTINUED | OUTPATIENT
Start: 2024-03-05 | End: 2024-03-06

## 2024-03-05 RX ORDER — CHLORHEXIDINE GLUCONATE 213 G/1000ML
1 SOLUTION TOPICAL DAILY
Refills: 0 | Status: DISCONTINUED | OUTPATIENT
Start: 2024-03-05 | End: 2024-03-05

## 2024-03-05 RX ORDER — OXYTOCIN 10 UNIT/ML
2 VIAL (ML) INJECTION
Qty: 30 | Refills: 0 | Status: DISCONTINUED | OUTPATIENT
Start: 2024-03-05 | End: 2024-03-06

## 2024-03-05 RX ORDER — SODIUM CHLORIDE 9 MG/ML
1000 INJECTION, SOLUTION INTRAVENOUS
Refills: 0 | Status: DISCONTINUED | OUTPATIENT
Start: 2024-03-05 | End: 2024-03-05

## 2024-03-05 RX ORDER — SODIUM CHLORIDE 9 MG/ML
1000 INJECTION, SOLUTION INTRAVENOUS ONCE
Refills: 0 | Status: DISCONTINUED | OUTPATIENT
Start: 2024-03-05 | End: 2024-03-05

## 2024-03-05 RX ORDER — OXYTOCIN 10 UNIT/ML
333.33 VIAL (ML) INJECTION
Qty: 20 | Refills: 0 | Status: DISCONTINUED | OUTPATIENT
Start: 2024-03-05 | End: 2024-03-05

## 2024-03-05 RX ORDER — INFLUENZA VIRUS VACCINE 15; 15; 15; 15 UG/.5ML; UG/.5ML; UG/.5ML; UG/.5ML
0.5 SUSPENSION INTRAMUSCULAR ONCE
Refills: 0 | Status: DISCONTINUED | OUTPATIENT
Start: 2024-03-05 | End: 2024-03-06

## 2024-03-05 RX ORDER — SODIUM CHLORIDE 9 MG/ML
1000 INJECTION, SOLUTION INTRAVENOUS
Refills: 0 | Status: DISCONTINUED | OUTPATIENT
Start: 2024-03-05 | End: 2024-03-06

## 2024-03-05 RX ORDER — ONDANSETRON 8 MG/1
4 TABLET, FILM COATED ORAL ONCE
Refills: 0 | Status: COMPLETED | OUTPATIENT
Start: 2024-03-05 | End: 2024-03-05

## 2024-03-05 RX ORDER — CEFAZOLIN SODIUM 1 G
2000 VIAL (EA) INJECTION ONCE
Refills: 0 | Status: DISCONTINUED | OUTPATIENT
Start: 2024-03-05 | End: 2024-03-05

## 2024-03-05 RX ADMIN — ONDANSETRON 4 MILLIGRAM(S): 8 TABLET, FILM COATED ORAL at 22:45

## 2024-03-05 NOTE — OB PROVIDER TRIAGE NOTE - HISTORY OF PRESENT ILLNESS
17 yo  edc 3/15/24 c/o bloody mucous which came out of vagina while urinating today and also some irreg and painful ctxs. +fm, denies ROM and pregnancy complications. Pt recently had CMP and bile acids sent as she was experiencing itchy palms. She did not yet receive results. Pt denies med/surg hx. She has NKDA and sts her GBS is negative. Pt only takes prenatal vitamins. Last visit at [facility] with Dr nicholson was on 24 and she has a f/u appt on 3/7/24

## 2024-03-05 NOTE — OB RN TRIAGE NOTE - FALL HARM RISK - RISK INTERVENTIONS

## 2024-03-05 NOTE — OB PROVIDER H&P - ASSESSMENT
18y  @ 38.4 weeks, GBS neg, in labor    Admit to L & D  IV hydration, labs  Continuous EFM & TOCO monitoring  Clear Liquid diet  Pain Management PRN      Discussed with Dr. Zavala 18y  at 38.4 weeks, GBS neg, in labor    Admit to L & D  IV hydration, labs  Continuous EFM & TOCO monitoring  Clear Liquid diet  Pain Management PRN      Discussed with Dr. Zavala

## 2024-03-05 NOTE — OB PROVIDER H&P - ATTENDING COMMENTS
18y  at 38.4 weeks, GBS neg, in labor  vtx presenting on sonogram. membranes intact  Maternal and fetal status reassuring   EFW ~3000 by leopold's      Labor process discussed with patient and she was informed process may take up to 3 days and involves augmentation methods such as pitocin. Other methods include AROM. Possibility and management of nonreassuring fetal status discussed (including resuscitative measures with repositioning, IV hydration, oxygen administration). Possibility of  delivery in event of persistent nonreassuring status remote from delivery or failure to progress/ descend.  Risks of hemorrhage and infection requiring medication or blood transfusion discussed. Patient endorsed understanding. All questions were answered. Patient desires to proceed with induction.     -anesthesia for pain management  -plan to AROM on next exam and start pitocin  -re-examine in 4 hrs or sooner if indicated

## 2024-03-05 NOTE — OB RN PATIENT PROFILE - FALL HARM RISK - RISK INTERVENTIONS

## 2024-03-05 NOTE — OB PROVIDER H&P - NSHPPHYSICALEXAM_GEN_ALL_CORE
Vital Signs Last 24 Hrs  T(C): 36.4 (05 Mar 2024 19:35), Max: 36.9 (05 Mar 2024 15:09)  T(F): 97.5 (05 Mar 2024 19:35), Max: 98.4 (05 Mar 2024 15:09)  HR: 83 (05 Mar 2024 19:35) (83 - 99)  BP: 123/70 (05 Mar 2024 19:35) (123/70 - 128/74)  RR: 18 (05 Mar 2024 19:35) (16 - 18)    Gen: AOx3, no acute distress  Abd: soft, gravid, non tender, mildly palpable contractions  Ext: No edema bilaterally    EFM:  130/mod/+accels; cat 1  [place]: q2  SVE: 4/70/-2   vertex intact

## 2024-03-05 NOTE — OB PROVIDER TRIAGE NOTE - NSHPPHYSICALEXAM_GEN_ALL_CORE
Vital Signs Last 24 Hrs  T(C): 36.9 (05 Mar 2024 15:09), Max: 36.9 (05 Mar 2024 15:09)  T(F): 98.4 (05 Mar 2024 15:09), Max: 98.4 (05 Mar 2024 15:09)  HR: 99 (05 Mar 2024 15:09) (99 - 99)  BP: 128/74 (05 Mar 2024 15:09) (128/74 - 128/74)  BP(mean): --  RR: 16 (05 Mar 2024 15:09) (16 - 16)  SpO2: --    SSE: membranes visualized, cervical dilation noted. No pooling or active bleeding noted.   fhr 140s, reactive, toco mild-moderate and irreg  ve 2-3/70/-2  BSS: c/a/buzz 10.3/+fm and FHR/ EFW 7-3oz 3260gm

## 2024-03-05 NOTE — OB RN TRIAGE NOTE - NS_TRIAGEPROVIDERNOTIFIED_OBGYN_ALL_OB_FT
Your test was NEGATIVE for COVID-19 (coronavirus).      You may leave home and/or return to work when the following conditions are met:  24 hours fever free without fever-reducing medications AND  Improved symptoms  You are fully vaccinated or have not had close contact with someone with COVID-19 (within 6 feet for 15 minutes or more)    If you are fully vaccinated and had a close contact, there is no need for quarantine, unless you develop symptoms.      If you are not fully vaccinated and had a close contact:  Retest at 5 to 7 days post-exposure  If possible, it is recommended that you quarantine for 5 days from the time of contact regardless of your test status.  A mask should be worn indoors post quarantine.         gumaro real

## 2024-03-05 NOTE — OB RN TRIAGE NOTE - FALL HARM RISK - UNIVERSAL INTERVENTIONS
Bed in lowest position, wheels locked, appropriate side rails in place/Call bell, personal items and telephone in reach/Instruct patient to call for assistance before getting out of bed or chair/Non-slip footwear when patient is out of bed/[employer] [place] to call system/Physically safe environment - no spills, clutter or unnecessary equipment/Purposeful Proactive Rounding/Room/bathroom lighting operational, light cord in reach

## 2024-03-05 NOTE — OB PROVIDER H&P - HISTORY OF PRESENT ILLNESS
19y/o  at 38w4d, dated by LMP confirmed by first trimester sonogram with CLAUDIA 3/15/2024, presents for contractions since this morning, increasing in frequency and duration. Denies ROM and pregnancy complications. Patient had negative work up for r/o cholestasis. Denies vaginal bleeding. Feels good fetal movements. Previously seen on L&D this morning and was 2.5cm. No complications this pregnancy. GBS neg         17y/o  at 38w4d, dated by LMP confirmed by first trimester sonogram with CLAUDIA 3/15/2024, presents for contractions since this morning, increasing in frequency and duration. Denies ROM and pregnancy complications. Patient had negative work up for r/o cholestasis. Denies vaginal bleeding. Feels good fetal movements. Previously seen on L&D this morning and was 2.5cm. No complications this pregnancy.

## 2024-03-05 NOTE — OB PROVIDER H&P - NSHPPHYSICALEXAM_GEN_ALL_CORE
Vital Signs Last 24 Hrs  T(C): 36.9 (05 Mar 2024 15:09), Max: 36.9 (05 Mar 2024 15:09)  T(F): 98.4 (05 Mar 2024 15:09), Max: 98.4 (05 Mar 2024 15:09)  HR: 99 (05 Mar 2024 15:09) (99 - 99)  BP: 128/74 (05 Mar 2024 15:09) (128/74 - 128/74)  BP(mean): --  RR: 16 (05 Mar 2024 15:09) (16 - 16)  SpO2: --

## 2024-03-05 NOTE — OB RN PATIENT PROFILE - NS_FINALEDD_OBGYN_ALL_OB_DT
"Chief Complaint   Patient presents with     ER F/U     /73 (BP Location: Left arm, Patient Position: Sitting, Cuff Size: Adult Regular)   Pulse 56   Temp 98.5  F (36.9  C) (Oral)   Resp 16   Ht 1.613 m (5' 3.5\")   Wt 59 kg (130 lb)   SpO2 98%   Breastfeeding? No   BMI 22.67 kg/m   Estimated body mass index is 22.67 kg/m  as calculated from the following:    Height as of this encounter: 1.613 m (5' 3.5\").    Weight as of this encounter: 59 kg (130 lb).  bp completed using cuff size: regular       Health Maintenance addressed:  NONE    n/a    Duke Figueroa MA     "
15-Mar-2024

## 2024-03-05 NOTE — OB RN TRIAGE NOTE - NSSDOHCURSE_OBGYN_A_OB
"-- Message is from the [facility]--    Referral Request  Name of Specialist: DR Evelin Stevenson    Provider's specialty: Ophthalmology  Reason for referral: FOLLOW UP     Is this a NEW request?:       Referral ordered by: DR CLAY      Insurance type:       Payor: [address] [place] [address] Road / Plan: [address] / Product Type: HILLARY Risk      Preferred Delivery Method   Fax - number to send to: [phone]  PATIENTS APPT IS 2019    Caller Information       Type Contact Phone    2019 01:34 PM Phone (Incoming) Kristine (Network) [phone]          Alternative phone number:     Turnaround time given to caller: ""This message will be sent to [facility] Provider's full name]. The clinical team will return your call as soon as they review your message. Typically, it takes 3 business days to process referral requests. \""  "
REFERAL DONE AND FAXED TO DR Muro Pollen OFFICE, PATIENT AWARE
never

## 2024-03-05 NOTE — OB PROVIDER TRIAGE NOTE - ATTENDING COMMENTS
P0 AT 38+4WGA IN PRODROMAL LABOR   MATERNAL AND FETAL STATUS REASSURING   AMBULATION AND HYDRATION ENCOURAGED   RETURN PRECAUTIONS GIVEN

## 2024-03-05 NOTE — OB RN PATIENT PROFILE - HEART RATE (BEATS/MIN)
Pt called he was discharged today from hospital dr Cynthia Eller he was prescribed lyrica and it needs a prior auth he is out of this and needs the medication   Please call him asap today 83

## 2024-03-05 NOTE — OB RN TRIAGE NOTE - FALL HARM RISK - FALLEN IN PAST
Mohs Rapid Report Verbiage: The area of clinically evident tumor was marked with skin marking ink and appropriately hatched.  The initial incision was made following the Mohs approach through the skin.  The specimen was taken to the lab, divided into the necessary number of pieces, chromacoded and processed according to the Mohs protocol.  This was repeated in successive stages until a tumor free defect was achieved. No

## 2024-03-05 NOTE — OB RN PATIENT PROFILE - NS_PRENATALLOC_OBGYN_ALL_OB
Patient vital signs are at baseline: Yes  Patient able to ambulate as they were prior to admission or with assist devices provided by therapies during their stay:  Yes  Patient MUST void prior to discharge:  Yes  Patient able to tolerate oral intake:  Yes  Pain has adequate pain control using Oral analgesics:  Yes     MD Office

## 2024-03-05 NOTE — OB PROVIDER TRIAGE NOTE - ADDITIONAL INSTRUCTIONS
early labor  ambulate in hospital or at home  keep hydrated with increased fluid intake  return to L&D when contractions become more painful, when water breaks or when patient needs pain medication

## 2024-03-05 NOTE — OB PROVIDER TRIAGE NOTE - NSOBPROVIDERNOTE_OBGYN_ALL_OB_FT
a/p iup at 38.4 wks        early labor        pt does not want pain mgmt or intervention at this time        CMP and bile acid results are wnl        pt will ambulate at home and return to L&D when ctxs become more frequent/painful or if SROM        dr contreras aware and agrees with mgmt

## 2024-03-06 LAB
AMPHET UR-MCNC: NEGATIVE — SIGNIFICANT CHANGE UP
BARBITURATES UR SCN-MCNC: NEGATIVE — SIGNIFICANT CHANGE UP
BASOPHILS # BLD AUTO: 0.04 K/UL — SIGNIFICANT CHANGE UP (ref 0–0.2)
BASOPHILS NFR BLD AUTO: 0.3 % — SIGNIFICANT CHANGE UP (ref 0–1)
BENZODIAZ UR-MCNC: NEGATIVE — SIGNIFICANT CHANGE UP
BUPRENORPHINE SCREEN, URINE RESULT: NEGATIVE — SIGNIFICANT CHANGE UP
COCAINE METAB.OTHER UR-MCNC: NEGATIVE — SIGNIFICANT CHANGE UP
EOSINOPHIL # BLD AUTO: 0.05 K/UL — SIGNIFICANT CHANGE UP (ref 0–0.7)
EOSINOPHIL NFR BLD AUTO: 0.3 % — SIGNIFICANT CHANGE UP (ref 0–8)
FENTANYL UR QL: NEGATIVE — SIGNIFICANT CHANGE UP
HCT VFR BLD CALC: 31.8 % — LOW (ref 37–47)
HGB BLD-MCNC: 10.8 G/DL — LOW (ref 12–16)
IMM GRANULOCYTES NFR BLD AUTO: 0.7 % — HIGH (ref 0.1–0.3)
L&D DRUG SCREEN, URINE: SIGNIFICANT CHANGE UP
LYMPHOCYTES # BLD AUTO: 1.97 K/UL — SIGNIFICANT CHANGE UP (ref 1.2–3.4)
LYMPHOCYTES # BLD AUTO: 13.6 % — LOW (ref 20.5–51.1)
MCHC RBC-ENTMCNC: 30.8 PG — SIGNIFICANT CHANGE UP (ref 27–31)
MCHC RBC-ENTMCNC: 34 G/DL — SIGNIFICANT CHANGE UP (ref 32–37)
MCV RBC AUTO: 90.6 FL — SIGNIFICANT CHANGE UP (ref 81–99)
METHADONE UR-MCNC: NEGATIVE — SIGNIFICANT CHANGE UP
MONOCYTES # BLD AUTO: 1.07 K/UL — HIGH (ref 0.1–0.6)
MONOCYTES NFR BLD AUTO: 7.4 % — SIGNIFICANT CHANGE UP (ref 1.7–9.3)
NEUTROPHILS # BLD AUTO: 11.22 K/UL — HIGH (ref 1.4–6.5)
NEUTROPHILS NFR BLD AUTO: 77.7 % — HIGH (ref 42.2–75.2)
NRBC # BLD: 0 /100 WBCS — SIGNIFICANT CHANGE UP (ref 0–0)
OPIATES UR-MCNC: NEGATIVE — SIGNIFICANT CHANGE UP
OXYCODONE UR-MCNC: NEGATIVE — SIGNIFICANT CHANGE UP
PCP UR-MCNC: NEGATIVE — SIGNIFICANT CHANGE UP
PLATELET # BLD AUTO: 207 K/UL — SIGNIFICANT CHANGE UP (ref 130–400)
PMV BLD: 12.9 FL — HIGH (ref 7.4–10.4)
PROPOXYPHENE QUALITATIVE URINE RESULT: NEGATIVE — SIGNIFICANT CHANGE UP
RBC # BLD: 3.51 M/UL — LOW (ref 4.2–5.4)
RBC # FLD: 14.4 % — SIGNIFICANT CHANGE UP (ref 11.5–14.5)
WBC # BLD: 14.45 K/UL — HIGH (ref 4.8–10.8)
WBC # FLD AUTO: 14.45 K/UL — HIGH (ref 4.8–10.8)

## 2024-03-06 PROCEDURE — 59409 OBSTETRICAL CARE: CPT | Mod: U7

## 2024-03-06 RX ORDER — TETANUS TOXOID, REDUCED DIPHTHERIA TOXOID AND ACELLULAR PERTUSSIS VACCINE, ADSORBED 5; 2.5; 8; 8; 2.5 [IU]/.5ML; [IU]/.5ML; UG/.5ML; UG/.5ML; UG/.5ML
0.5 SUSPENSION INTRAMUSCULAR ONCE
Refills: 0 | Status: DISCONTINUED | OUTPATIENT
Start: 2024-03-06 | End: 2024-03-07

## 2024-03-06 RX ORDER — DEXAMETHASONE 0.5 MG/5ML
4 ELIXIR ORAL EVERY 6 HOURS
Refills: 0 | Status: DISCONTINUED | OUTPATIENT
Start: 2024-03-06 | End: 2024-03-06

## 2024-03-06 RX ORDER — DIPHENHYDRAMINE HCL 50 MG
25 CAPSULE ORAL EVERY 6 HOURS
Refills: 0 | Status: COMPLETED | OUTPATIENT
Start: 2024-03-06 | End: 2025-02-02

## 2024-03-06 RX ORDER — METOCLOPRAMIDE HCL 10 MG
10 TABLET ORAL ONCE
Refills: 0 | Status: COMPLETED | OUTPATIENT
Start: 2024-03-06 | End: 2024-03-06

## 2024-03-06 RX ORDER — KETOROLAC TROMETHAMINE 30 MG/ML
30 SYRINGE (ML) INJECTION ONCE
Refills: 0 | Status: DISCONTINUED | OUTPATIENT
Start: 2024-03-06 | End: 2024-03-06

## 2024-03-06 RX ORDER — OXYTOCIN 10 UNIT/ML
41.67 VIAL (ML) INJECTION
Qty: 20 | Refills: 0 | Status: DISCONTINUED | OUTPATIENT
Start: 2024-03-06 | End: 2024-03-07

## 2024-03-06 RX ORDER — BENZOCAINE 10 %
1 GEL (GRAM) MUCOUS MEMBRANE EVERY 6 HOURS
Refills: 0 | Status: DISCONTINUED | OUTPATIENT
Start: 2024-03-06 | End: 2024-03-07

## 2024-03-06 RX ORDER — ACETAMINOPHEN 500 MG
975 TABLET ORAL
Refills: 0 | Status: DISCONTINUED | OUTPATIENT
Start: 2024-03-06 | End: 2024-03-07

## 2024-03-06 RX ORDER — FENTANYL/BUPIVACAINE/NS/PF 2MCG/ML-.1
250 PLASTIC BAG, INJECTION (ML) INJECTION
Refills: 0 | Status: DISCONTINUED | OUTPATIENT
Start: 2024-03-06 | End: 2024-03-06

## 2024-03-06 RX ORDER — IBUPROFEN 200 MG
600 TABLET ORAL EVERY 6 HOURS
Refills: 0 | Status: COMPLETED | OUTPATIENT
Start: 2024-03-06 | End: 2025-02-02

## 2024-03-06 RX ORDER — SIMETHICONE 80 MG/1
80 TABLET, CHEWABLE ORAL EVERY 4 HOURS
Refills: 0 | Status: DISCONTINUED | OUTPATIENT
Start: 2024-03-06 | End: 2024-03-07

## 2024-03-06 RX ORDER — SODIUM CHLORIDE 9 MG/ML
3 INJECTION INTRAMUSCULAR; INTRAVENOUS; SUBCUTANEOUS EVERY 8 HOURS
Refills: 0 | Status: DISCONTINUED | OUTPATIENT
Start: 2024-03-06 | End: 2024-03-07

## 2024-03-06 RX ORDER — DIBUCAINE 1 %
1 OINTMENT (GRAM) RECTAL EVERY 6 HOURS
Refills: 0 | Status: DISCONTINUED | OUTPATIENT
Start: 2024-03-06 | End: 2024-03-07

## 2024-03-06 RX ORDER — IBUPROFEN 200 MG
600 TABLET ORAL EVERY 6 HOURS
Refills: 0 | Status: DISCONTINUED | OUTPATIENT
Start: 2024-03-06 | End: 2024-03-07

## 2024-03-06 RX ORDER — DIPHENHYDRAMINE HCL 50 MG
25 CAPSULE ORAL EVERY 6 HOURS
Refills: 0 | Status: DISCONTINUED | OUTPATIENT
Start: 2024-03-06 | End: 2024-03-07

## 2024-03-06 RX ORDER — ONDANSETRON 8 MG/1
4 TABLET, FILM COATED ORAL EVERY 6 HOURS
Refills: 0 | Status: DISCONTINUED | OUTPATIENT
Start: 2024-03-06 | End: 2024-03-06

## 2024-03-06 RX ORDER — OXYCODONE HYDROCHLORIDE 5 MG/1
5 TABLET ORAL ONCE
Refills: 0 | Status: DISCONTINUED | OUTPATIENT
Start: 2024-03-06 | End: 2024-03-07

## 2024-03-06 RX ORDER — MAGNESIUM HYDROXIDE 400 MG/1
30 TABLET, CHEWABLE ORAL
Refills: 0 | Status: DISCONTINUED | OUTPATIENT
Start: 2024-03-06 | End: 2024-03-07

## 2024-03-06 RX ORDER — AER TRAVELER 0.5 G/1
1 SOLUTION RECTAL; TOPICAL EVERY 4 HOURS
Refills: 0 | Status: DISCONTINUED | OUTPATIENT
Start: 2024-03-06 | End: 2024-03-07

## 2024-03-06 RX ORDER — OXYCODONE HYDROCHLORIDE 5 MG/1
5 TABLET ORAL
Refills: 0 | Status: DISCONTINUED | OUTPATIENT
Start: 2024-03-06 | End: 2024-03-07

## 2024-03-06 RX ORDER — PRAMOXINE HYDROCHLORIDE 150 MG/15G
1 AEROSOL, FOAM RECTAL EVERY 4 HOURS
Refills: 0 | Status: DISCONTINUED | OUTPATIENT
Start: 2024-03-06 | End: 2024-03-07

## 2024-03-06 RX ORDER — NALOXONE HYDROCHLORIDE 4 MG/.1ML
0.1 SPRAY NASAL
Refills: 0 | Status: DISCONTINUED | OUTPATIENT
Start: 2024-03-06 | End: 2024-03-06

## 2024-03-06 RX ORDER — HYDROCORTISONE 1 %
1 OINTMENT (GRAM) TOPICAL EVERY 6 HOURS
Refills: 0 | Status: DISCONTINUED | OUTPATIENT
Start: 2024-03-06 | End: 2024-03-07

## 2024-03-06 RX ORDER — LANOLIN
1 OINTMENT (GRAM) TOPICAL EVERY 6 HOURS
Refills: 0 | Status: DISCONTINUED | OUTPATIENT
Start: 2024-03-06 | End: 2024-03-07

## 2024-03-06 RX ADMIN — Medication 30 MILLIGRAM(S): at 03:36

## 2024-03-06 RX ADMIN — Medication 975 MILLIGRAM(S): at 09:10

## 2024-03-06 RX ADMIN — Medication 975 MILLIGRAM(S): at 09:39

## 2024-03-06 RX ADMIN — Medication 600 MILLIGRAM(S): at 23:27

## 2024-03-06 RX ADMIN — Medication 975 MILLIGRAM(S): at 15:52

## 2024-03-06 RX ADMIN — Medication 10 MILLIGRAM(S): at 00:34

## 2024-03-06 RX ADMIN — Medication 30 MILLIGRAM(S): at 03:25

## 2024-03-06 RX ADMIN — SODIUM CHLORIDE 3 MILLILITER(S): 9 INJECTION INTRAMUSCULAR; INTRAVENOUS; SUBCUTANEOUS at 14:25

## 2024-03-06 RX ADMIN — Medication 975 MILLIGRAM(S): at 15:21

## 2024-03-06 NOTE — ANESTHESIA FOLLOW-UP NOTE - ANESTHESIOLOGIST NAME
10/24/22 0942   Appointment Info   Signing Clinician's Name / Credentials (PT) Josselin Pierson DPT   Living Environment   People in Home child(tiana), adult;spouse   Current Living Arrangements house   Home Accessibility stairs to enter home;stairs within home   Number of Stairs, Main Entrance 4   Stair Railings, Main Entrance railing on left side (ascending)   Number of Stairs, Within Home, Primary   (15)   Stair Railings, Within Home, Primary railings safe and in good condition   Transportation Anticipated car, drives self;family or friend will provide   Living Environment Comments Pt's family is able to assist at discharge.   Self-Care   Usual Activity Tolerance excellent   Current Activity Tolerance fair   Regular Exercise Yes   Activity/Exercise Type walking;strength training   Exercise Amount/Frequency 3-5 times/wk   Equipment Currently Used at Home none   Fall history within last six months no   General Information   Onset of Illness/Injury or Date of Surgery 10/23/22   Referring Physician Aaliyah Albarran PA-C   Pertinent History of Current Problem (include personal factors and/or comorbidities that impact the POC) 50 y/o female admitted with back pain, pt underwent L3-L4 discectomy on 10/7.   Existing Precautions/Restrictions fall;spinal   General Observations Pt in supine upon arrival of therapist.   Cognition   Affect/Mental Status (Cognition) WFL   Orientation Status (Cognition) oriented x 3   Follows Commands (Cognition) WFL   Pain Assessment   Patient Currently in Pain   (L hip and L LE pain at rest: 5/10.)   Integumentary/Edema   Integumentary/Edema Comments Surgical incision site not observed.   Posture    Posture Comments Noted forward head and shoulder posture sitting EOB and standing at FWW.   Range of Motion (ROM)   ROM Comment B LEs WFL.   Strength (Manual Muscle Testing)   Strength Comments Not formally assessed, noted weakness in L LE secondary to pain, pt demonstrates at least 3/5  grossly in B LEs with functional mobility.   Bed Mobility   Comment, (Bed Mobility) Supine-sit with SBA.   Transfers   Comment, (Transfers) Sit <> stand with FWW and CGA.   Gait/Stairs (Locomotion)   Comment, (Gait/Stairs) Pt amb 10' with FWW and CGA.   Balance   Balance Comments Noted good seated and standing balance with UE support on FWW.   Sensory Examination   Sensory Perception Comments Pt reports numbness/tingling in anterior left shin and dorsal aspect of foot.   Clinical Impression   Criteria for Skilled Therapeutic Intervention Yes, treatment indicated   PT Diagnosis (PT) Difficulty with functional mobility.   Influenced by the following impairments Pain, Generalized weakness, Decreased activity tolerance   Functional limitations due to impairments Limited functional mobility requiring AD and assist.   Clinical Presentation (PT Evaluation Complexity) Stable/Uncomplicated   Clinical Presentation Rationale Based on PMH, current presentation, and social support.   Clinical Decision Making (Complexity) low complexity   Planned Therapy Interventions (PT) bed mobility training;gait training;strengthening;stair training;transfer training   Risk & Benefits of therapy have been explained patient   PT Total Evaluation Time   PT Eval, Low Complexity Minutes (90937) 5   Plan of Care Review   Plan of Care Reviewed With patient   Physical Therapy Goals   PT Frequency Daily   PT Predicted Duration/Target Date for Goal Attainment 10/27/22   PT: Bed Mobility Modified independent;Supine to/from sit;Within precautions   PT: Transfers Modified independent;Sit to/from stand;Assistive device;Within precautions   PT: Gait Supervision/stand-by assist;Rolling walker;100 feet;Within precautions   PT: Stairs Minimal assist;4 stairs;Rail on left   Therapeutic Activity   Therapeutic Activities: dynamic activities to improve functional performance Minutes (33310) 10   Symptoms Noted During/After Treatment Fatigue;Increased pain    Treatment Detail/Skilled Intervention PT: Educated pt on role and POC of PT. Reviewed spinal precautions with pt, pt verbalized understanding. Pt performed sit-supine with HOB elevated. Sit <> stand with FWW and CGA, adjusted FWW height to improve upright posture. Pt amb an additional 5' and 35' with FWW and CGA progressing to SBA. Noted guarded weightbearing through L LE secondary to pain and numbness. Pt declined sitting up in chair at end of session d/t discomfort. Pt returned to supine at end of session. RN updated.   PT Discharge Planning   PT Plan PT: Progress gait tolerance, assess stairs when appropriate   PT Discharge Recommendation (DC Rec) home with assist   PT Rationale for DC Rec Anticipate pt will continue to progress towards independence with improvement in pain management. Pt is below baseline, currently requiring use of FWW and SBA-CGA, limited by pain and numbness.   Total Session Time   Timed Code Treatment Minutes 10   Total Session Time (sum of timed and untimed services) 15      Hasham

## 2024-03-06 NOTE — OB PROVIDER DELIVERY SUMMARY - NSSELHIDDEN_OBGYN_ALL_OB_FT
[NS_DeliveryAttending1_OBGYN_ALL_OB_FT:[id number]=],[NS_DeliveryAssist1_OBGYN_ALL_OB_FT:[id number]=],[NS_DeliveryRN_OBGYN_ALL_OB_FT:[id number]=],[NS_CirculateRN2_OBGYN_ALL_OB_FT:[id number]=]

## 2024-03-06 NOTE — OB RN DELIVERY SUMMARY - NSSELHIDDEN_OBGYN_ALL_OB_FT
[NS_DeliveryAttending1_OBGYN_ALL_OB_FT:[id number]],[NS_DeliveryAssist1_OBGYN_ALL_OB_FT:[id number]=],[NS_DeliveryRN_OBGYN_ALL_OB_FT:[id number]=],[NS_CirculateRN2_OBGYN_ALL_OB_FT:[id number]=]

## 2024-03-06 NOTE — OB RN DELIVERY SUMMARY - NS_SEPSISRSKCALC_OBGYN_ALL_OB_FT
Rupture of membranes must be entered above.   EOS calculated successfully. EOS Risk Factor: 0.5/1000 live births ([facility] national incidence); GA=38w5d; Temp=97.5; ROM=3.15; GBS='Negative'; Antibiotics='No antibiotics or any antibiotics < 2 hrs prior to birth'

## 2024-03-06 NOTE — OB PROVIDER DELIVERY SUMMARY - NSPROVIDERDELIVERYNOTE_OBGYN_ALL_OB_FT
Patient was fully dilated and pushing. Fetal head was OA and restituted to LOT. The anterior and posterior shoulders delivered, followed by the remaining body atraumatically. Delayed cord clamping was performed, and then clamped and cut. Cord blood gases collected x2. The  was handed to the mother and RN. The placenta delivered intact with membranes. Pitocin was administered without additional interventions. Uterus massaged, fundus found to be firm. Cervix, vagina and perineum inspected, small periurethral laceration was noted, repaired using 3-0 chromic in the usual fashion with good hemostasis.     Viable male infant delivered, weighing 2715, with APGARs 9/9      Dr. Zavala was present for the delivery

## 2024-03-06 NOTE — PROCEDURE NOTE - ADDITIONAL PROCEDURE DETAILS
Thorough discussion of patient's history was had with patient. Discussed risks of epidural/spinal, including PDPH, inadequate analgesia occasionally requiring epidural catheter replacement/general anesthesia, bleeding, infection, spinal cord injury, hypotension, and nausea. Patient expressed understanding of these risks, signed informed consent, and wishes to proceed with the procedure.    Patient sitting. Lumbar epidural performed. Standard ASA monitors including FHR used. Sterile gloves, Chloraprep used. 1% lidocaine was used for local infiltration. 17g Tuohy used to achieve ALMAZ at 8 cm. Catheter secured in place at 13 cm. Tuohy needle removed. Negative aspiration. Test dose consisting of 3cc 1.5% lidocaine with epinephrine was negative. Patient tolerated procedure and was hemodynamically stable throughout.    Called to bedside 30 minutes later, patient states not having relief from the epidural. Gave 10cc 0.25 bupivacaine without relief. Discussed options with patient, decided to replace epidural with new catheter.    Patient sitting. Lumbar CSE performed. Standard ASA monitors including FHR used. Sterile gloves, Chloraprep used. 1% lidocaine was used for local infiltration. 17g Tuohy used to achieve ALMAZ at 10 cm. 27g Fady used to make a dural puncture for CSF confirmation. 1cc 0.25 bupivacaine administered intrathecally. Fady needle removed easily. Catheter secured in place at 15 cm. Tuohy needle removed. Negative aspiration. Test dose consisting of 3cc 1.5% lidocaine with epinephrine was negative. Patient tolerated procedure and was hemodynamically stable throughout.    Administered 100mcg fentanyl via epidural additionally after patient endorsing improved pain but still 7/10 pain. Patient found relief after fentanyl administration. Thorough discussion of patient's history was had with patient. Discussed risks of epidural/spinal, including PDPH, inadequate analgesia occasionally requiring epidural catheter replacement/general anesthesia, bleeding, infection, spinal cord injury, hypotension, and nausea. Patient expressed understanding of these risks, signed informed consent, and wishes to proceed with the procedure.    Patient sitting. Lumbar epidural performed. Standard ASA monitors including FHR used. Sterile gloves, Chloraprep used. 1% lidocaine was used for local infiltration. 17g Tuohy used to achieve ALMAZ at 8 cm. Catheter secured in place at 13 cm. Tuohy needle removed. Negative aspiration. Test dose consisting of 3cc 1.5% lidocaine with epinephrine was negative. Patient tolerated procedure and was hemodynamically stable throughout.    Called to bedside 30 minutes later, patient states not having relief from the epidural. Gave 10cc 0.25 bupivacaine without relief. Discussed options with patient, decided to replace epidural with new catheter.    Patient sitting. Lumbar CSE performed. Standard ASA monitors including FHR used. Sterile gloves, Chloraprep used. 1% lidocaine was used for local infiltration. 17g Tuohy used to achieve ALMAZ at 10 cm. 27g Fady used to make a dural puncture for CSF confirmation. 1cc 0.25 bupivacaine administered intrathecally. Fady needle removed easily. Catheter secured in place at 15 cm. Tuohy needle removed. Negative aspiration. Test dose consisting of 3cc 1.5% lidocaine with epinephrine was negative. Patient tolerated procedure and was hemodynamically stable throughout.    Administered 100mcg fentanyl via epidural additionally after patient endorsing improved pain but still 7/10 pain. Patient found relief after fentanyl administration.        Vital signs stable  No anesthesia complications from labor epidural  Patient discharged to OB post partum care as per policy

## 2024-03-07 ENCOUNTER — TRANSCRIPTION ENCOUNTER (OUTPATIENT)
Age: 19
End: 2024-03-07

## 2024-03-07 ENCOUNTER — APPOINTMENT (OUTPATIENT)
Dept: OBGYN | Facility: CLINIC | Age: 19
End: 2024-03-07

## 2024-03-07 VITALS
TEMPERATURE: 98 F | DIASTOLIC BLOOD PRESSURE: 57 MMHG | HEART RATE: 73 BPM | SYSTOLIC BLOOD PRESSURE: 116 MMHG | RESPIRATION RATE: 18 BRPM

## 2024-03-07 DIAGNOSIS — Z3A.38 38 WEEKS GESTATION OF PREGNANCY: ICD-10-CM

## 2024-03-07 DIAGNOSIS — O47.1 FALSE LABOR AT OR AFTER 37 COMPLETED WEEKS OF GESTATION: ICD-10-CM

## 2024-03-07 PROCEDURE — 99231 SBSQ HOSP IP/OBS SF/LOW 25: CPT

## 2024-03-07 RX ORDER — IBUPROFEN 200 MG
1 TABLET ORAL
Qty: 0 | Refills: 0 | DISCHARGE
Start: 2024-03-07

## 2024-03-07 RX ORDER — ACETAMINOPHEN 500 MG
3 TABLET ORAL
Qty: 0 | Refills: 0 | DISCHARGE
Start: 2024-03-07

## 2024-03-07 RX ADMIN — SIMETHICONE 80 MILLIGRAM(S): 80 TABLET, CHEWABLE ORAL at 07:55

## 2024-03-07 RX ADMIN — Medication 600 MILLIGRAM(S): at 12:43

## 2024-03-07 RX ADMIN — Medication 1 TABLET(S): at 11:30

## 2024-03-07 RX ADMIN — Medication 1 SPRAY(S): at 13:14

## 2024-03-07 RX ADMIN — Medication 600 MILLIGRAM(S): at 11:30

## 2024-03-07 RX ADMIN — Medication 1 APPLICATION(S): at 13:14

## 2024-03-07 RX ADMIN — Medication 600 MILLIGRAM(S): at 00:00

## 2024-03-07 NOTE — DISCHARGE NOTE OB - CARE PROVIDER_API CALL
Kanu Ca  Obstetrics and Gynecology  [address] [place] [id number]  Phone: [phone]  Fax: [phone]  Follow Up Time:

## 2024-03-07 NOTE — PROGRESS NOTE ADULT - ASSESSMENT
A/P: 19yo now P1 S/P  PPD 1 , recovering well   - pain management with PO pain meds   - monitor vitals/bleeding   - encourage PO hydration, ambulation  - regular diet as tolerated   - routine postpartum care   - anticipate discharge home today  
A/P: 18y  @38w5d, GBS neg, in active labor, s/p epidural (@0000)  - SROM @2330 mec  -pain management prn  -cont efm/toco  -f/u pending labs  -cont to monitor vitals  -cont iv hydration    d/w Dr. Acuña and Dr. Zavala

## 2024-03-07 NOTE — DISCHARGE NOTE OB - MATERIALS PROVIDED
Vaccinations/[facility]  Screening Program/  Immunization Record/Breastfeeding Log/Guide to Postpartum Care/[facility] Hearing Screen Program/Back To Sleep Handout/Shaken Baby Prevention Handout/Breastfeeding Guide and Packet/Birth Certificate Instructions/Discharge Medication Information for Patients and Families Pocket Guide

## 2024-03-07 NOTE — DISCHARGE NOTE OB - NS MD DC FALL RISK RISK
For information on Fall & Injury Prevention, visit: [email or web address] OR  [email or web address] OR  https://www.cdc.gov/steadi/patient.html

## 2024-03-07 NOTE — DISCHARGE NOTE OB - HOSPITAL COURSE
S/p uncomplicated  followed by an uncomplicated postpartum course. Patient met all postpartum milestones and vitals stable for discharge.

## 2024-03-07 NOTE — DISCHARGE NOTE OB - PATIENT PORTAL LINK FT
You can access the FollowMyHealth Patient Portal offered by [facility] by registering at the following website: [email or web address]. By joining [employer]’s FollowMyHealth portal, you will also be able to view your health information using other applications (apps) compatible with our system.

## 2024-03-07 NOTE — PROGRESS NOTE ADULT - SUBJECTIVE AND OBJECTIVE BOX
PGY 1 Note:    Pt seen and evaluated at bedside. Pain well controlled. Vaginal bleeding minimal. Denies headaches, CP, SOB, palpitations or RUQ/epigastric pain. Denies fever, chills, dizziness/lightheadedness, nausea/vomiting, dysuria, or LE pain. Pt is ambulating, voiding, and tolerating regular diet.     Physical Exam  Vital Signs Last 24 Hrs  T(C): 36.4 (06 Mar 2024 23:33), Max: 37 (06 Mar 2024 16:04)  T(F): 97.5 (06 Mar 2024 23:33), Max: 98.6 (06 Mar 2024 16:04)  HR: 82 (06 Mar 2024 23:33) (74 - 86)  BP: 117/62 (06 Mar 2024 23:33) (96/50 - 117/62)  RR: 18 (06 Mar 2024 23:33) (18 - 18)      Gen: AAOx3, NAD  Fundus: firm, below umbilicus   Abd: Soft, nontender, nondistended  Lochia: minimal   Ext: No calf tenderness, no swelling    Labs:                        10.8   14.45 )-----------( 207      ( 06 Mar 2024 11:06 )             31.8                         12.2   16.74 )-----------( 236      ( 05 Mar 2024 20:55 )             36.0        MEDICATIONS  (STANDING):  acetaminophen     Tablet .. 975 milliGRAM(s) Oral <User Schedule>  diphtheria/tetanus/pertussis (acellular) Vaccine (Adacel) 0.5 milliLiter(s) IntraMuscular once  ibuprofen  Tablet. 600 milliGRAM(s) Oral every 6 hours  oxytocin Infusion 41.667 milliUNIT(s)/Min (125 mL/Hr) IV Continuous <Continuous>  prenatal multivitamin 1 Tablet(s) Oral daily  sodium chloride 0.9% lock flush 3 milliLiter(s) IV Push every 8 hours    MEDICATIONS  (PRN):  benzocaine 20%/menthol 0.5% Spray 1 Spray(s) Topical every 6 hours PRN for Perineal discomfort  dibucaine 1% Ointment 1 Application(s) Topical every 6 hours PRN Perineal discomfort  diphenhydrAMINE 25 milliGRAM(s) Oral every 6 hours PRN Pruritus  hydrocortisone 1% Cream 1 Application(s) Topical every 6 hours PRN Moderate Pain (4-6)  lanolin Ointment 1 Application(s) Topical every 6 hours PRN nipple soreness  magnesium hydroxide Suspension 30 milliLiter(s) Oral two times a day PRN Constipation  oxyCODONE    IR 5 milliGRAM(s) Oral every 3 hours PRN Moderate to Severe Pain (4-10)  oxyCODONE    IR 5 milliGRAM(s) Oral once PRN Moderate to Severe Pain (4-10)  pramoxine 1%/zinc 5% Cream 1 Application(s) Topical every 4 hours PRN Moderate Pain (4-6)  simethicone 80 milliGRAM(s) Chew every 4 hours PRN Gas  witch hazel Pads 1 Application(s) Topical every 4 hours PRN Perineal discomfort      
PGY 2 Note    Patient seen at bedside for evaluation of labor progression, doing well, no complaints.     T(F): 97.5 (24 @ 19:35), Max: 98.4 (24 @ 15:09)  HR: 82 (24 @ 00:48) (69 - 100)  BP: 126/62 (24 @ 00:38) (111/71 - 146/72)  RR: 18 (24 @ 22:14) (16 - 18)  SpO2: 98% (24 @ 00:48) (89% - 100%)    EFM: 130/mod evelia/+ accels/+ variables  TOCO: q3min  SVE: 7/90/-2    Medications:  (ADM OVERRIDE): 1 Each (24 @ 21:55)  (ADM OVERRIDE): 1 Each (24 @ 22:41)  (ADM OVERRIDE): 1 Each (24 @ 22:43)  metoclopramide Injectable: 10 milliGRAM(s) (24 @ 00:34)  ondansetron Injectable: 4 milliGRAM(s) (24 @ 22:45)      Labs:                        12.2   16.74 )-----------( 236      ( 05 Mar 2024 20:55 )             36.0           ABO RH Interpretation: O POS (24 @ 20:55)    Antibody Screen: NEG (24 @ 20:55)    Urinalysis Basic - ( 05 Mar 2024 20:55 )    Color: Yellow / Appearance: Clear / S.016 / pH: x  Gluc: x / Ketone: 40 mg/dL  / Bili: Negative / Urobili: 1.0 mg/dL   Blood: x / Protein: Trace mg/dL / Nitrite: Negative   Leuk Esterase: Small / RBC: 40 /HPF / WBC 8 /HPF   Sq Epi: x / Non Sq Epi: 4 /HPF / Bacteria: Negative /HPF        Prenatal Syphilis Test: Nonreact (24 @ 20:55)

## 2024-03-13 LAB — BILE AC SER-MCNC: 7.6 UMOL/L

## 2024-03-14 ENCOUNTER — APPOINTMENT (OUTPATIENT)
Dept: OBGYN | Facility: CLINIC | Age: 19
End: 2024-03-14

## 2024-03-21 DIAGNOSIS — Z28.09 IMMUNIZATION NOT CARRIED OUT BECAUSE OF OTHER CONTRAINDICATION: ICD-10-CM

## 2024-03-21 DIAGNOSIS — Z28.21 IMMUNIZATION NOT CARRIED OUT BECAUSE OF PATIENT REFUSAL: ICD-10-CM

## 2024-03-21 DIAGNOSIS — Z3A.38 38 WEEKS GESTATION OF PREGNANCY: ICD-10-CM

## 2024-03-21 DIAGNOSIS — E66.9 OBESITY, UNSPECIFIED: ICD-10-CM

## 2024-05-02 ENCOUNTER — OUTPATIENT (OUTPATIENT)
Dept: OUTPATIENT SERVICES | Facility: HOSPITAL | Age: 19
LOS: 1 days | End: 2024-05-02

## 2024-05-02 ENCOUNTER — APPOINTMENT (OUTPATIENT)
Dept: OBGYN | Facility: CLINIC | Age: 19
End: 2024-05-02

## 2024-05-02 ENCOUNTER — APPOINTMENT (OUTPATIENT)
Dept: OBGYN | Facility: CLINIC | Age: 19
End: 2024-05-02
Payer: MEDICAID

## 2024-05-02 ENCOUNTER — OUTPATIENT (OUTPATIENT)
Dept: OUTPATIENT SERVICES | Facility: HOSPITAL | Age: 19
LOS: 1 days | End: 2024-05-02
Payer: MEDICAID

## 2024-05-02 VITALS
DIASTOLIC BLOOD PRESSURE: 60 MMHG | SYSTOLIC BLOOD PRESSURE: 100 MMHG | WEIGHT: 192 LBS | BODY MASS INDEX: 36.25 KG/M2 | HEIGHT: 61 IN

## 2024-05-02 DIAGNOSIS — N39.3 STRESS INCONTINENCE (FEMALE) (MALE): ICD-10-CM

## 2024-05-02 DIAGNOSIS — Z71.3 DIETARY COUNSELING AND SURVEILLANCE: ICD-10-CM

## 2024-05-02 PROCEDURE — 99214 OFFICE O/P EST MOD 30 MIN: CPT

## 2024-05-02 PROCEDURE — 81513 NFCT DS BV RNA VAG FLU ALG: CPT

## 2024-05-02 PROCEDURE — 87591 N.GONORRHOEAE DNA AMP PROB: CPT

## 2024-05-02 PROCEDURE — 99459 PELVIC EXAMINATION: CPT

## 2024-05-02 PROCEDURE — 87661 TRICHOMONAS VAGINALIS AMPLIF: CPT

## 2024-05-02 PROCEDURE — 87491 CHLMYD TRACH DNA AMP PROBE: CPT

## 2024-05-02 PROCEDURE — 87481 CANDIDA DNA AMP PROBE: CPT

## 2024-05-02 NOTE — HISTORY OF PRESENT ILLNESS
[Postpartum Follow Up] : postpartum follow up [Complications:___] : no complications [] : delivered by vaginal delivery [Breastfeeding] : currently nursing [Resumed Menses] : has resumed her menses [Resumed [place]] : has not resumed intercourse [Intended Contraception] : the patient does not intended to use contraception postpartum [Fatigue] : no fatigue [Dysuria] : no dysuria [Normal] : the vagina was normal [Healing Well] : is not healing well [Examination Of The Breasts] : breasts are normal [Doing Well] : is doing well [None] : None [de-identified] : reporting urge incontinence [de-identified] : pelvic floor Pt Continue PNV  high protein diet  RTC for annual/ contraceptin

## 2024-05-05 LAB
BV BACTERIA RRNA VAG QL NAA+PROBE: DETECTED
C GLABRATA RNA VAG QL NAA+PROBE: NOT DETECTED
C TRACH RRNA SPEC QL NAA+PROBE: NOT DETECTED
CANDIDA RRNA VAG QL PROBE: NOT DETECTED
N GONORRHOEA RRNA SPEC QL NAA+PROBE: NOT DETECTED
T VAGINALIS RRNA SPEC QL NAA+PROBE: NOT DETECTED

## 2024-05-05 RX ORDER — METRONIDAZOLE 7.5 MG/G
0.75 GEL VAGINAL
Qty: 1 | Refills: 2 | Status: ACTIVE | COMMUNITY
Start: 2024-05-05 | End: 1900-01-01

## 2024-05-06 DIAGNOSIS — N39.3 STRESS INCONTINENCE (FEMALE) (MALE): ICD-10-CM

## 2024-06-10 ENCOUNTER — EMERGENCY (EMERGENCY)
Facility: HOSPITAL | Age: 19
LOS: 0 days | Discharge: HOME | End: 2024-06-11
Attending: EMERGENCY MEDICINE
Payer: MEDICAID

## 2024-06-10 VITALS
HEART RATE: 84 BPM | TEMPERATURE: 98 F | OXYGEN SATURATION: 98 % | DIASTOLIC BLOOD PRESSURE: 68 MMHG | SYSTOLIC BLOOD PRESSURE: 103 MMHG | RESPIRATION RATE: 16 BRPM | WEIGHT: 193.35 LBS

## 2024-06-10 DIAGNOSIS — R10.2 PELVIC AND PERINEAL PAIN: ICD-10-CM

## 2024-06-10 DIAGNOSIS — R10.32 LEFT LOWER QUADRANT PAIN: ICD-10-CM

## 2024-06-10 LAB
APPEARANCE UR: ABNORMAL
BACTERIA # UR AUTO: ABNORMAL /HPF
BILIRUB UR-MCNC: NEGATIVE — SIGNIFICANT CHANGE UP
CAST: 1 /LPF — SIGNIFICANT CHANGE UP (ref 0–4)
COLOR SPEC: YELLOW — SIGNIFICANT CHANGE UP
DIFF PNL FLD: NEGATIVE — SIGNIFICANT CHANGE UP
GLUCOSE UR QL: NEGATIVE MG/DL — SIGNIFICANT CHANGE UP
KETONES UR-MCNC: 40 MG/DL
LEUKOCYTE ESTERASE UR-ACNC: ABNORMAL
NITRITE UR-MCNC: NEGATIVE — SIGNIFICANT CHANGE UP
PH UR: 6 — SIGNIFICANT CHANGE UP (ref 5–8)
PROT UR-MCNC: NEGATIVE MG/DL — SIGNIFICANT CHANGE UP
RBC CASTS # UR COMP ASSIST: 1 /HPF — SIGNIFICANT CHANGE UP (ref 0–4)
SP GR SPEC: 1.02 — SIGNIFICANT CHANGE UP (ref 1–1.03)
SQUAMOUS # UR AUTO: 12 /HPF — HIGH (ref 0–5)
UROBILINOGEN FLD QL: 0.2 MG/DL — SIGNIFICANT CHANGE UP (ref 0.2–1)
WBC UR QL: 11 /HPF — HIGH (ref 0–5)

## 2024-06-10 PROCEDURE — 81001 URINALYSIS AUTO W/SCOPE: CPT

## 2024-06-10 PROCEDURE — 99284 EMERGENCY DEPT VISIT MOD MDM: CPT

## 2024-06-10 PROCEDURE — 87086 URINE CULTURE/COLONY COUNT: CPT

## 2024-06-10 PROCEDURE — 87077 CULTURE AEROBIC IDENTIFY: CPT

## 2024-06-10 PROCEDURE — 99284 EMERGENCY DEPT VISIT MOD MDM: CPT | Mod: 25

## 2024-06-10 PROCEDURE — 36000 PLACE NEEDLE IN VEIN: CPT

## 2024-06-10 PROCEDURE — 76830 TRANSVAGINAL US NON-OB: CPT

## 2024-06-10 RX ORDER — ACETAMINOPHEN 500 MG
975 TABLET ORAL ONCE
Refills: 0 | Status: COMPLETED | OUTPATIENT
Start: 2024-06-10 | End: 2024-06-10

## 2024-06-10 RX ADMIN — Medication 975 MILLIGRAM(S): at 22:06

## 2024-06-11 PROCEDURE — 76830 TRANSVAGINAL US NON-OB: CPT | Mod: 26

## 2024-06-11 NOTE — ED PROVIDER NOTE - OBJECTIVE STATEMENT
Patient is a 18 year old female with no significant PMH presenting for abdominal pain. Patient endorses a sudden onset of LLQ/suprapubic, non-radiating, intermittent sharp pain, worse with movement x1 day. Patient denies fevers, hematuria, dysuria, chest pain, shortness of breath, nausea, vomiting, flank tenderness, leg swelling/pain/tenderness, or additional complaints.    Of note, patient is 4 months post-partum, still breastfeeding and has not had a return of menstrual periods.

## 2024-06-11 NOTE — ED PROVIDER NOTE - PATIENT'S SEXUAL ORIENTATION
Patient had inr done in their home on POC monitor by [facility] and spoke with CHAU Walker. Denies and changes in health and no missed dose. Reports did take several doses of Pepto Bismol for upset stomach and discussed will interact with Warfarin. INR high and instructed to hold Warfarin this PM and then reduced dosing and recheck inr in 2 weeks. Onsite provider Dr ORTIZ Rodrigues   Heterosexual

## 2024-06-11 NOTE — ED PROVIDER NOTE - NSFOLLOWUPCLINICS_GEN_ALL_ED_FT
[facility]  OB/GYN  [address] [place] [address]  Phone: [phone]  Fax:   Follow Up Time: Routine

## 2024-06-11 NOTE — ED PEDIATRIC NURSE NOTE - EXTENSIONS OF SELF_ADULT
Discharge Note    Date of Service:18    Progress: Patient has 5% weight loss, bili LR, patient has voided and stooled, circ done 18     Normal                    Exam Notable findings   General:  no distress,  good cry X AGA   Skin:  warm, no lesions X    Head, neck:  AFOSF, sutures palpable,  symmetric face X AFOSF   Eyes:  PERRL, red reflexes present X RR+ Bilat   ENT:  palate intact, normally placed ears, nares patent, throat clear X    Thorax:  clavicles intact, symmetric chest/nipples X    Lungs:  clear, unlabored, normal  respiratory rate X CTA bilaterally   Heart:  RRR, no murmurs, 2+ femoral  pulses X no murmurs, femoral pulses 2+ bilaterally   Abdomen:  soft, no masses, normal cord X    Genitalia:  appropriate for given sex X normal circumcised penis, testes descended bilateral   Anus:  patent X    Trunk/Spine:  straight, no dimple X    Extremities:  10 fingers/toes, normal  palmar/plantar creases, negative  [place]/Ortolani X hips normal   Neuro:  normal tone; normal rooting, suck, [place], Babinski reflexes X      Assessment:  Full term 37 wk  has been slowly working up on feeds falls asleep at the breast on DOL:1.    Plan:      1.  Routine  care and anticipatory guidance given.  2.  Mother to breast feed ad herminio per feeding cues, not to exceed 4 hrs. Lacation consultation as needed.  3.  Hepatitis B vaccine given 18  4.  Hearing screen passed, Cardiac screen passed, and  screen collected 18, circumcision done 18  5.  Transcutaneous bilirubin at 46 hours of life 8.0 low risk.  6.  PCP will be [facility] to follow-up 12/10/18 at 1400             Electronically Signed On 2018 11:34  ___________________________________________________   Dari BRUNO, Laura Negron    
None

## 2024-06-11 NOTE — ED PROVIDER NOTE - PHYSICAL EXAMINATION
VITAL SIGNS: I have reviewed nursing notes and confirm.  CONSTITUTIONAL: Well-appearing, non-toxic, in NAD  SKIN: Warm dry, normal skin turgor  HEAD: NCAT  EYES: No conjunctival injection, scleral anicteric  ENT: Moist mucous membranes, normal pharynx with no erythema or exudates  NECK: Supple; full ROM. Nontender. No cervical LAD  CARD: RRR, no murmurs, rubs or gallops  RESP: Clear to ausculation bilaterally.  No rales, rhonchi, or wheezing.  ABD: Soft, non-distended, LLQ-tenderness, and suprapubic tenderness; no rebound or guarding. No CVA tenderness  EXT: Full ROM, no bony tenderness, no pedal edema, no calf tenderness  NEURO: Normal motor, normal sensory. l. Normal gait.  PSYCH: Cooperative, appropriate.

## 2024-06-11 NOTE — ED PROVIDER NOTE - CLINICAL SUMMARY MEDICAL DECISION MAKING FREE TEXT BOX
17 yo F, 4 most post partum here for assessment of LLQ/suprapubic pain -- pain acute in onset, non radiating, described as sharp, worse with movement. Not associated with any VB, nausea, vomiting, diarrhea, constipation, diarrhea, fever, chills, hematuria.    VS normal, on exam has mild L sided pelvic ttp, no rebound or guarding.     Patient had slightly contaminated UA with 11 wbc, will hold off treating pending culture.  Pelvic US without acute pathology.     After tylenol sx significantly improved. No longer having ttp. Given L sided sx, improvement with analgesia and lack of associated sx, no indication for additional imaging such as CT scan.    Sx may represent ovulation as she has not had menses since delivery (ucg negative.)    WIll dc with sx monitoring, return precautions, follow up.

## 2024-06-11 NOTE — ED PROVIDER NOTE - PATIENT PORTAL LINK FT
You can access the FollowMyHealth Patient Portal offered by [facility] by registering at the following website: [email or web address]. By joining [email or web address]’s FollowMyHealth portal, you will also be able to view your health information using other applications (apps) compatible with our system.

## 2024-06-13 LAB
CULTURE RESULTS: SIGNIFICANT CHANGE UP
SPECIMEN SOURCE: SIGNIFICANT CHANGE UP

## 2024-08-13 DIAGNOSIS — Z32.2 ENCOUNTER FOR CHILDBIRTH INSTRUCTION: ICD-10-CM

## 2024-09-03 NOTE — OB PROVIDER TRIAGE NOTE - NSOBPROC_OBGYN_ALL_OB
Nerve Block    Date/Time: 9/3/2024 1:37 PM    Performed by: Jamie Zheng MD  Authorized by: Jamie Zheng MD    Block Type: adductor canal  Indication: post-op pain management at surgeon's request    Preanesthetic Checklist Patient Identified (2 criteria), Block Plan Confirmed, Resuscitation Equipment Available, Supplemental O2 (if needed), Allergies Confirmed, Block Site Marked (if applicable), Monitors Applied, Aseptic Technique, Coagulation Status, Necessary Block Equipment Present, Timeout Performed, IV Access Functioning, Consent Verified, Drugs/Solutions Labeled and Sedation Given (if needed)    Patient Position:  Supine  Prep:  Chlorhexidine gluconate (CHG)  Max Sterile Barrier Technique:  Hand washing, cap/mask and sterile gloves  Monitoring:  Continuous pulse oximetry, EKG and blood pressure  Injection Technique:  Single-shot  Procedures: ultrasound guided and ultrasound permanent image saved    Local Infiltration:  Lidocaine  Needle Gauge:  21 G  Needle Length:  10 cm  Needle Localization:  Ultrasound guidance   in-plane  Physical status during block:  Awake  Medications Administered  ropivacaine-EPINEPHrine 0.5 %-1:[id number] - Injection, Thigh, Right   20 mL - 9/3/2024 1:37:00 PM  Injection Assessment:  No paresthesia on injection, no resistance to injection, negative aspiration for heme, incremental injection and local visualized surrounding nerve on ultrasound  Patient Condition:  Tolerated well, no immediate complications  Paresthesia Pain:  None  Heart Rate Change: No    Slowly Injected: Yes    Start Time:9/3/2024 1:37 PM  Stop Time: 9/3/2024 1:40 PM       None Done

## 2024-10-01 ENCOUNTER — NON-APPOINTMENT (OUTPATIENT)
Age: 19
End: 2024-10-01

## 2025-05-08 ENCOUNTER — OUTPATIENT (OUTPATIENT)
Dept: OUTPATIENT SERVICES | Facility: HOSPITAL | Age: 20
LOS: 1 days | End: 2025-05-08
Payer: MEDICAID

## 2025-05-08 ENCOUNTER — APPOINTMENT (OUTPATIENT)
Dept: OBGYN | Facility: CLINIC | Age: 20
End: 2025-05-08
Payer: MEDICAID

## 2025-05-08 ENCOUNTER — NON-APPOINTMENT (OUTPATIENT)
Age: 20
End: 2025-05-08

## 2025-05-08 VITALS
SYSTOLIC BLOOD PRESSURE: 106 MMHG | BODY MASS INDEX: 35.3 KG/M2 | HEIGHT: 61 IN | DIASTOLIC BLOOD PRESSURE: 69 MMHG | WEIGHT: 187 LBS

## 2025-05-08 DIAGNOSIS — N39.46 MIXED INCONTINENCE: ICD-10-CM

## 2025-05-08 DIAGNOSIS — Z01.419 ENCOUNTER FOR GYNECOLOGICAL EXAMINATION (GENERAL) (ROUTINE) WITHOUT ABNORMAL FINDINGS: ICD-10-CM

## 2025-05-08 DIAGNOSIS — N81.9 MIXED INCONTINENCE: ICD-10-CM

## 2025-05-08 PROCEDURE — 99459 PELVIC EXAMINATION: CPT

## 2025-05-08 PROCEDURE — 99395 PREV VISIT EST AGE 18-39: CPT

## 2025-05-08 PROCEDURE — 87661 TRICHOMONAS VAGINALIS AMPLIF: CPT

## 2025-05-08 PROCEDURE — 81513 NFCT DS BV RNA VAG FLU ALG: CPT

## 2025-05-08 PROCEDURE — 87491 CHLMYD TRACH DNA AMP PROBE: CPT

## 2025-05-08 PROCEDURE — 87481 CANDIDA DNA AMP PROBE: CPT

## 2025-05-08 PROCEDURE — 87591 N.GONORRHOEAE DNA AMP PROB: CPT

## 2025-05-09 DIAGNOSIS — Z01.419 ENCOUNTER FOR GYNECOLOGICAL EXAMINATION (GENERAL) (ROUTINE) WITHOUT ABNORMAL FINDINGS: ICD-10-CM

## 2025-05-09 DIAGNOSIS — N39.46 MIXED INCONTINENCE: ICD-10-CM

## 2025-07-11 ENCOUNTER — APPOINTMENT (OUTPATIENT)
Dept: UROGYNECOLOGY | Facility: CLINIC | Age: 20
End: 2025-07-11

## 2025-07-11 ENCOUNTER — LABORATORY RESULT (OUTPATIENT)
Age: 20
End: 2025-07-11

## 2025-07-11 VITALS
DIASTOLIC BLOOD PRESSURE: 74 MMHG | SYSTOLIC BLOOD PRESSURE: 111 MMHG | BODY MASS INDEX: 30.96 KG/M2 | WEIGHT: 164 LBS | HEIGHT: 61 IN | HEART RATE: 76 BPM

## 2025-07-11 PROCEDURE — 51701 INSERT BLADDER CATHETER: CPT

## 2025-07-11 PROCEDURE — 99215 OFFICE O/P EST HI 40 MIN: CPT | Mod: 25

## 2025-07-11 PROCEDURE — 99459 PELVIC EXAMINATION: CPT

## 2025-07-11 PROCEDURE — 99205 OFFICE O/P NEW HI 60 MIN: CPT | Mod: 25

## 2025-07-14 LAB
APPEARANCE: ABNORMAL
BILIRUBIN URINE: NEGATIVE
BLOOD URINE: ABNORMAL
COLOR: ABNORMAL
GLUCOSE QUALITATIVE U: NEGATIVE
KETONES URINE: NORMAL
LEUKOCYTE ESTERASE URINE: NEGATIVE
NITRITE URINE: NEGATIVE
PH URINE: 6
PROTEIN URINE: ABNORMAL
SPECIFIC GRAVITY URINE: >=1.03
URINE CULTURE <10: NORMAL
UROBILINOGEN URINE: NORMAL

## 2025-08-31 ENCOUNTER — EMERGENCY (EMERGENCY)
Facility: HOSPITAL | Age: 20
LOS: 0 days | Discharge: HOME | End: 2025-08-31
Attending: STUDENT IN AN ORGANIZED HEALTH CARE EDUCATION/TRAINING PROGRAM
Payer: MEDICAID

## 2025-08-31 VITALS
OXYGEN SATURATION: 95 % | HEART RATE: 80 BPM | DIASTOLIC BLOOD PRESSURE: 81 MMHG | TEMPERATURE: 98 F | RESPIRATION RATE: 16 BRPM | WEIGHT: 147.27 LBS | SYSTOLIC BLOOD PRESSURE: 124 MMHG

## 2025-08-31 DIAGNOSIS — R07.89 OTHER CHEST PAIN: ICD-10-CM

## 2025-08-31 DIAGNOSIS — R94.31 ABNORMAL ELECTROCARDIOGRAM [ECG] [EKG]: ICD-10-CM

## 2025-08-31 DIAGNOSIS — R42 DIZZINESS AND GIDDINESS: ICD-10-CM

## 2025-08-31 DIAGNOSIS — R11.2 NAUSEA WITH VOMITING, UNSPECIFIED: ICD-10-CM

## 2025-08-31 DIAGNOSIS — R06.02 SHORTNESS OF BREATH: ICD-10-CM

## 2025-08-31 DIAGNOSIS — F17.290 NICOTINE DEPENDENCE, OTHER TOBACCO PRODUCT, UNCOMPLICATED: ICD-10-CM

## 2025-08-31 DIAGNOSIS — R19.7 DIARRHEA, UNSPECIFIED: ICD-10-CM

## 2025-08-31 DIAGNOSIS — R50.9 FEVER, UNSPECIFIED: ICD-10-CM

## 2025-08-31 PROCEDURE — 71046 X-RAY EXAM CHEST 2 VIEWS: CPT | Mod: 26

## 2025-08-31 PROCEDURE — 93010 ELECTROCARDIOGRAM REPORT: CPT

## 2025-08-31 PROCEDURE — 93308 TTE F-UP OR LMTD: CPT

## 2025-08-31 PROCEDURE — 81025 URINE PREGNANCY TEST: CPT

## 2025-08-31 PROCEDURE — 93005 ELECTROCARDIOGRAM TRACING: CPT

## 2025-08-31 PROCEDURE — 99284 EMERGENCY DEPT VISIT MOD MDM: CPT | Mod: 25

## 2025-08-31 PROCEDURE — 71046 X-RAY EXAM CHEST 2 VIEWS: CPT

## 2025-08-31 RX ORDER — ONDANSETRON HCL/PF 4 MG/2 ML
1 VIAL (ML) INJECTION
Qty: 9 | Refills: 0
Start: 2025-08-31 | End: 2025-09-02

## 2025-08-31 RX ORDER — IBUPROFEN 200 MG
600 TABLET ORAL ONCE
Refills: 0 | Status: COMPLETED | OUTPATIENT
Start: 2025-08-31 | End: 2025-08-31

## 2025-08-31 RX ORDER — ONDANSETRON HCL/PF 4 MG/2 ML
4 VIAL (ML) INJECTION ONCE
Refills: 0 | Status: COMPLETED | OUTPATIENT
Start: 2025-08-31 | End: 2025-08-31

## 2025-08-31 RX ADMIN — Medication 600 MILLIGRAM(S): at 16:17

## 2025-08-31 RX ADMIN — Medication 4 MILLIGRAM(S): at 16:18
